# Patient Record
Sex: FEMALE | Race: WHITE | NOT HISPANIC OR LATINO | Employment: OTHER | URBAN - METROPOLITAN AREA
[De-identification: names, ages, dates, MRNs, and addresses within clinical notes are randomized per-mention and may not be internally consistent; named-entity substitution may affect disease eponyms.]

---

## 2017-01-01 ENCOUNTER — TELEPHONE (OUTPATIENT)
Dept: INTERNAL MEDICINE | Facility: CLINIC | Age: 82
End: 2017-01-01

## 2017-01-01 ENCOUNTER — HOSPITAL ENCOUNTER (EMERGENCY)
Facility: HOSPITAL | Age: 82
Discharge: HOME | End: 2017-07-26
Attending: EMERGENCY MEDICINE
Payer: MEDICARE

## 2017-01-01 ENCOUNTER — OFFICE VISIT (OUTPATIENT)
Dept: RHEUMATOLOGY | Facility: CLINIC | Age: 82
End: 2017-01-01
Payer: MEDICARE

## 2017-01-01 ENCOUNTER — OFFICE VISIT (OUTPATIENT)
Dept: CARDIOLOGY | Facility: CLINIC | Age: 82
End: 2017-01-01
Payer: MEDICARE

## 2017-01-01 ENCOUNTER — LAB VISIT (OUTPATIENT)
Dept: LAB | Facility: HOSPITAL | Age: 82
End: 2017-01-01
Attending: INTERNAL MEDICINE
Payer: MEDICARE

## 2017-01-01 ENCOUNTER — LAB VISIT (OUTPATIENT)
Dept: LAB | Facility: HOSPITAL | Age: 82
End: 2017-01-01
Attending: PHYSICIAN ASSISTANT
Payer: MEDICARE

## 2017-01-01 ENCOUNTER — OUTPATIENT CASE MANAGEMENT (OUTPATIENT)
Dept: ADMINISTRATIVE | Facility: OTHER | Age: 82
End: 2017-01-01

## 2017-01-01 ENCOUNTER — PATIENT MESSAGE (OUTPATIENT)
Dept: INTERNAL MEDICINE | Facility: CLINIC | Age: 82
End: 2017-01-01

## 2017-01-01 ENCOUNTER — OFFICE VISIT (OUTPATIENT)
Dept: INTERNAL MEDICINE | Facility: CLINIC | Age: 82
End: 2017-01-01
Payer: MEDICARE

## 2017-01-01 ENCOUNTER — HOSPITAL ENCOUNTER (EMERGENCY)
Facility: HOSPITAL | Age: 82
Discharge: HOME | End: 2017-07-08
Attending: EMERGENCY MEDICINE
Payer: MEDICARE

## 2017-01-01 ENCOUNTER — DOCUMENTATION ONLY (OUTPATIENT)
Dept: AUDIOLOGY | Facility: CLINIC | Age: 82
End: 2017-01-01

## 2017-01-01 ENCOUNTER — HOSPITAL ENCOUNTER (OUTPATIENT)
Dept: RADIOLOGY | Facility: HOSPITAL | Age: 82
Discharge: HOME | End: 2017-06-07
Attending: FAMILY MEDICINE
Payer: MEDICARE

## 2017-01-01 ENCOUNTER — OFFICE VISIT (OUTPATIENT)
Dept: OTOLARYNGOLOGY | Facility: CLINIC | Age: 82
End: 2017-01-01
Payer: MEDICARE

## 2017-01-01 ENCOUNTER — HOSPITAL ENCOUNTER (EMERGENCY)
Facility: HOSPITAL | Age: 82
Discharge: OTHER INSTITUTION | End: 2017-06-01
Attending: EMERGENCY MEDICINE
Payer: MEDICARE

## 2017-01-01 ENCOUNTER — PATIENT MESSAGE (OUTPATIENT)
Dept: CARDIOLOGY | Facility: CLINIC | Age: 82
End: 2017-01-01

## 2017-01-01 ENCOUNTER — HOSPITAL ENCOUNTER (EMERGENCY)
Facility: HOSPITAL | Age: 82
Discharge: HOME | End: 2017-06-11
Attending: EMERGENCY MEDICINE
Payer: MEDICARE

## 2017-01-01 ENCOUNTER — TELEPHONE (OUTPATIENT)
Dept: OTOLARYNGOLOGY | Facility: CLINIC | Age: 82
End: 2017-01-01

## 2017-01-01 ENCOUNTER — PATIENT MESSAGE (OUTPATIENT)
Dept: ADMINISTRATIVE | Facility: OTHER | Age: 82
End: 2017-01-01

## 2017-01-01 VITALS
SYSTOLIC BLOOD PRESSURE: 136 MMHG | WEIGHT: 104.94 LBS | BODY MASS INDEX: 20.6 KG/M2 | DIASTOLIC BLOOD PRESSURE: 74 MMHG | HEART RATE: 80 BPM | HEIGHT: 60 IN | TEMPERATURE: 98 F

## 2017-01-01 VITALS
BODY MASS INDEX: 20.65 KG/M2 | HEART RATE: 77 BPM | DIASTOLIC BLOOD PRESSURE: 51 MMHG | SYSTOLIC BLOOD PRESSURE: 106 MMHG | HEART RATE: 92 BPM | TEMPERATURE: 98 F | DIASTOLIC BLOOD PRESSURE: 60 MMHG | SYSTOLIC BLOOD PRESSURE: 110 MMHG | WEIGHT: 105.19 LBS | DIASTOLIC BLOOD PRESSURE: 62 MMHG | HEIGHT: 60 IN | TEMPERATURE: 98 F | BODY MASS INDEX: 20.65 KG/M2 | HEIGHT: 60 IN | WEIGHT: 105.19 LBS | OXYGEN SATURATION: 90 % | TEMPERATURE: 99 F | SYSTOLIC BLOOD PRESSURE: 102 MMHG

## 2017-01-01 VITALS
BODY MASS INDEX: 20.69 KG/M2 | SYSTOLIC BLOOD PRESSURE: 118 MMHG | HEIGHT: 60 IN | WEIGHT: 105.38 LBS | HEART RATE: 86 BPM | DIASTOLIC BLOOD PRESSURE: 71 MMHG

## 2017-01-01 VITALS
OXYGEN SATURATION: 97 % | RESPIRATION RATE: 20 BRPM | WEIGHT: 99 LBS | HEART RATE: 87 BPM | DIASTOLIC BLOOD PRESSURE: 76 MMHG | TEMPERATURE: 97 F | BODY MASS INDEX: 19.33 KG/M2 | SYSTOLIC BLOOD PRESSURE: 126 MMHG

## 2017-01-01 VITALS
DIASTOLIC BLOOD PRESSURE: 64 MMHG | HEIGHT: 60 IN | BODY MASS INDEX: 21.65 KG/M2 | HEART RATE: 75 BPM | WEIGHT: 110.25 LBS | TEMPERATURE: 98 F | SYSTOLIC BLOOD PRESSURE: 142 MMHG

## 2017-01-01 VITALS
WEIGHT: 97.44 LBS | HEART RATE: 65 BPM | SYSTOLIC BLOOD PRESSURE: 130 MMHG | BODY MASS INDEX: 19.03 KG/M2 | TEMPERATURE: 98 F | DIASTOLIC BLOOD PRESSURE: 80 MMHG

## 2017-01-01 VITALS
WEIGHT: 107.81 LBS | SYSTOLIC BLOOD PRESSURE: 110 MMHG | HEIGHT: 60 IN | BODY MASS INDEX: 21.17 KG/M2 | OXYGEN SATURATION: 93 % | HEART RATE: 70 BPM | DIASTOLIC BLOOD PRESSURE: 60 MMHG

## 2017-01-01 VITALS
OXYGEN SATURATION: 97 % | WEIGHT: 105 LBS | DIASTOLIC BLOOD PRESSURE: 79 MMHG | RESPIRATION RATE: 19 BRPM | HEART RATE: 70 BPM | HEIGHT: 60 IN | TEMPERATURE: 98 F | SYSTOLIC BLOOD PRESSURE: 135 MMHG | BODY MASS INDEX: 20.62 KG/M2

## 2017-01-01 VITALS
HEIGHT: 60 IN | DIASTOLIC BLOOD PRESSURE: 64 MMHG | HEART RATE: 68 BPM | BODY MASS INDEX: 19.09 KG/M2 | SYSTOLIC BLOOD PRESSURE: 116 MMHG | WEIGHT: 97.25 LBS

## 2017-01-01 VITALS
TEMPERATURE: 98 F | HEIGHT: 60 IN | WEIGHT: 105 LBS | HEART RATE: 86 BPM | RESPIRATION RATE: 22 BRPM | SYSTOLIC BLOOD PRESSURE: 140 MMHG | OXYGEN SATURATION: 98 % | BODY MASS INDEX: 20.62 KG/M2 | DIASTOLIC BLOOD PRESSURE: 68 MMHG

## 2017-01-01 VITALS
HEART RATE: 64 BPM | HEIGHT: 60 IN | BODY MASS INDEX: 19.67 KG/M2 | SYSTOLIC BLOOD PRESSURE: 122 MMHG | DIASTOLIC BLOOD PRESSURE: 70 MMHG | WEIGHT: 100.19 LBS

## 2017-01-01 VITALS
HEART RATE: 82 BPM | HEIGHT: 61 IN | SYSTOLIC BLOOD PRESSURE: 100 MMHG | DIASTOLIC BLOOD PRESSURE: 70 MMHG | TEMPERATURE: 98 F | OXYGEN SATURATION: 97 %

## 2017-01-01 VITALS
HEIGHT: 56 IN | OXYGEN SATURATION: 96 % | HEART RATE: 114 BPM | DIASTOLIC BLOOD PRESSURE: 77 MMHG | BODY MASS INDEX: 23.62 KG/M2 | WEIGHT: 105 LBS | SYSTOLIC BLOOD PRESSURE: 142 MMHG | TEMPERATURE: 98 F | RESPIRATION RATE: 29 BRPM

## 2017-01-01 DIAGNOSIS — R41.82 ALTERED MENTAL STATUS, UNSPECIFIED ALTERED MENTAL STATUS TYPE: ICD-10-CM

## 2017-01-01 DIAGNOSIS — I50.42 CHRONIC COMBINED SYSTOLIC AND DIASTOLIC CONGESTIVE HEART FAILURE: Chronic | ICD-10-CM

## 2017-01-01 DIAGNOSIS — S42.031A CLOSED DISPLACED FRACTURE OF ACROMIAL END OF RIGHT CLAVICLE, INITIAL ENCOUNTER: ICD-10-CM

## 2017-01-01 DIAGNOSIS — Z95.0 PACEMAKER: Chronic | ICD-10-CM

## 2017-01-01 DIAGNOSIS — S00.03XA CONTUSION OF SCALP, INITIAL ENCOUNTER: ICD-10-CM

## 2017-01-01 DIAGNOSIS — I48.20 CHRONIC ATRIAL FIBRILLATION: Chronic | ICD-10-CM

## 2017-01-01 DIAGNOSIS — S00.03XD SCALP HEMATOMA, SUBSEQUENT ENCOUNTER: Primary | ICD-10-CM

## 2017-01-01 DIAGNOSIS — R11.0 NAUSEA: ICD-10-CM
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
## 2017-01-01 DIAGNOSIS — M81.0 OSTEOPOROSIS: ICD-10-CM

## 2017-01-01 DIAGNOSIS — I60.9 SAH (SUBARACHNOID HEMORRHAGE): ICD-10-CM

## 2017-01-01 DIAGNOSIS — S40.021A CONTUSION OF MULTIPLE SITES OF SHOULDER AND UPPER ARM, RIGHT, INITIAL ENCOUNTER: ICD-10-CM

## 2017-01-01 DIAGNOSIS — M25.551 RIGHT HIP PAIN: Primary | ICD-10-CM

## 2017-01-01 DIAGNOSIS — Z95.0 PACEMAKER: ICD-10-CM

## 2017-01-01 DIAGNOSIS — I25.10 CORONARY ARTERY DISEASE INVOLVING NATIVE CORONARY ARTERY OF NATIVE HEART WITHOUT ANGINA PECTORIS: Chronic | ICD-10-CM

## 2017-01-01 DIAGNOSIS — S32.591A PUBIC RAMUS FRACTURE, RIGHT, CLOSED, INITIAL ENCOUNTER: Primary | ICD-10-CM

## 2017-01-01 DIAGNOSIS — M81.0 AGE-RELATED OSTEOPOROSIS WITHOUT CURRENT PATHOLOGICAL FRACTURE: Primary | Chronic | ICD-10-CM

## 2017-01-01 DIAGNOSIS — S06.360D TRAUMATIC HEMORRHAGE OF CEREBRUM WITHOUT LOSS OF CONSCIOUSNESS, UNSPECIFIED LATERALITY, SUBSEQUENT ENCOUNTER: ICD-10-CM

## 2017-01-01 DIAGNOSIS — R79.89 LOW SERUM VITAMIN D: ICD-10-CM

## 2017-01-01 DIAGNOSIS — K44.9 HIATAL HERNIA: ICD-10-CM
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
## 2017-01-01 DIAGNOSIS — S40.011A CONTUSION OF MULTIPLE SITES OF SHOULDER AND UPPER ARM, RIGHT, INITIAL ENCOUNTER: ICD-10-CM

## 2017-01-01 DIAGNOSIS — M41.9 KYPHOSCOLIOSIS: Chronic | ICD-10-CM

## 2017-01-01 DIAGNOSIS — S09.90XA HEAD TRAUMA: ICD-10-CM

## 2017-01-01 DIAGNOSIS — J01.90 ACUTE SINUSITIS, RECURRENCE NOT SPECIFIED, UNSPECIFIED LOCATION: Primary | ICD-10-CM

## 2017-01-01 DIAGNOSIS — Z51.81 MEDICATION MONITORING ENCOUNTER: ICD-10-CM

## 2017-01-01 DIAGNOSIS — M15.9 PRIMARY OSTEOARTHRITIS INVOLVING MULTIPLE JOINTS: Chronic | ICD-10-CM

## 2017-01-01 DIAGNOSIS — E03.9 HYPOTHYROIDISM (ACQUIRED): ICD-10-CM

## 2017-01-01 DIAGNOSIS — L89.610 DECUBITUS ULCER OF RIGHT HEEL, UNSTAGEABLE: Primary | ICD-10-CM

## 2017-01-01 DIAGNOSIS — M81.0 AGE-RELATED OSTEOPOROSIS WITHOUT CURRENT PATHOLOGICAL FRACTURE: Primary | ICD-10-CM

## 2017-01-01 DIAGNOSIS — R54 FRAIL ELDERLY: ICD-10-CM

## 2017-01-01 DIAGNOSIS — M47.816 SPONDYLOSIS OF LUMBAR REGION WITHOUT MYELOPATHY OR RADICULOPATHY: ICD-10-CM

## 2017-01-01 DIAGNOSIS — I10 ESSENTIAL HYPERTENSION: Chronic | ICD-10-CM

## 2017-01-01 DIAGNOSIS — M81.0 OSTEOPOROSIS: Primary | Chronic | ICD-10-CM

## 2017-01-01 DIAGNOSIS — S09.90XA HEAD INJURY DUE TO TRAUMA, INITIAL ENCOUNTER: ICD-10-CM

## 2017-01-01 DIAGNOSIS — J01.90 ACUTE BACTERIAL SINUSITIS: Primary | ICD-10-CM

## 2017-01-01 DIAGNOSIS — R60.9 EDEMA, UNSPECIFIED TYPE: ICD-10-CM

## 2017-01-01 DIAGNOSIS — E86.0 DEHYDRATION: Primary | ICD-10-CM
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
## 2017-01-01 DIAGNOSIS — R53.1 WEAKNESS: ICD-10-CM

## 2017-01-01 DIAGNOSIS — B96.89 ACUTE BACTERIAL SINUSITIS: Primary | ICD-10-CM

## 2017-01-01 DIAGNOSIS — I50.42 CHRONIC COMBINED SYSTOLIC AND DIASTOLIC CONGESTIVE HEART FAILURE: Primary | Chronic | ICD-10-CM

## 2017-01-01 DIAGNOSIS — I34.0 NON-RHEUMATIC MITRAL REGURGITATION: ICD-10-CM

## 2017-01-01 DIAGNOSIS — R54 AGE-RELATED PHYSICAL DEBILITY: ICD-10-CM

## 2017-01-01 DIAGNOSIS — M25.551 RIGHT HIP PAIN: ICD-10-CM

## 2017-01-01 DIAGNOSIS — R41.82 ALTERED MENTAL STATUS: ICD-10-CM

## 2017-01-01 DIAGNOSIS — I48.20 CHRONIC ATRIAL FIBRILLATION: Primary | Chronic | ICD-10-CM

## 2017-01-01 DIAGNOSIS — H90.3 SENSORINEURAL HEARING LOSS (SNHL) OF BOTH EARS: ICD-10-CM

## 2017-01-01 DIAGNOSIS — Z11.1 SCREENING-PULMONARY TB: ICD-10-CM

## 2017-01-01 DIAGNOSIS — M15.9 PRIMARY OSTEOARTHRITIS INVOLVING MULTIPLE JOINTS: ICD-10-CM

## 2017-01-01 DIAGNOSIS — R11.10 VOMITING: ICD-10-CM

## 2017-01-01 DIAGNOSIS — S42.034D CLOSED NONDISPLACED FRACTURE OF ACROMIAL END OF RIGHT CLAVICLE WITH ROUTINE HEALING, SUBSEQUENT ENCOUNTER: Primary | ICD-10-CM

## 2017-01-01 DIAGNOSIS — I95.1 ORTHOSTATIC HYPOTENSION: ICD-10-CM

## 2017-01-01 DIAGNOSIS — R60.0 BILATERAL LOWER EXTREMITY EDEMA: ICD-10-CM

## 2017-01-01 DIAGNOSIS — S00.01XA SCALP ABRASION, INITIAL ENCOUNTER: ICD-10-CM

## 2017-01-01 DIAGNOSIS — K21.00 GASTROESOPHAGEAL REFLUX DISEASE WITH ESOPHAGITIS: ICD-10-CM

## 2017-01-01 DIAGNOSIS — R29.6 FALLS FREQUENTLY: ICD-10-CM

## 2017-01-01 LAB
ALBUMIN SERPL BCP-MCNC: 3.1 G/DL
ALBUMIN SERPL BCP-MCNC: 3.3 G/DL
ALBUMIN SERPL BCP-MCNC: 3.3 G/DL
ALBUMIN SERPL BCP-MCNC: 3.5 G/DL
ALBUMIN SERPL BCP-MCNC: 3.6 G/DL
ALP SERPL-CCNC: 101 U/L
ALP SERPL-CCNC: 47 U/L
ALP SERPL-CCNC: 48 U/L
ALP SERPL-CCNC: 56 U/L
ALP SERPL-CCNC: 59 U/L
ALT SERPL W/O P-5'-P-CCNC: 10 U/L
ALT SERPL W/O P-5'-P-CCNC: 10 U/L
ALT SERPL W/O P-5'-P-CCNC: 14 U/L
ALT SERPL W/O P-5'-P-CCNC: 7 U/L
ALT SERPL W/O P-5'-P-CCNC: 9 U/L
ANION GAP SERPL CALC-SCNC: 10 MMOL/L
ANION GAP SERPL CALC-SCNC: 10 MMOL/L
ANION GAP SERPL CALC-SCNC: 11 MMOL/L
ANION GAP SERPL CALC-SCNC: 9 MMOL/L
ANION GAP SERPL CALC-SCNC: 9 MMOL/L
APTT BLDCRRT: 31 SEC
AST SERPL-CCNC: 15 U/L
AST SERPL-CCNC: 15 U/L
AST SERPL-CCNC: 16 U/L
AST SERPL-CCNC: 17 U/L
AST SERPL-CCNC: 23 U/L
BACTERIA #/AREA URNS HPF: ABNORMAL /HPF
BASOPHILS # BLD AUTO: 0.02 K/UL
BASOPHILS # BLD AUTO: 0.02 K/UL
BASOPHILS # BLD AUTO: 0.03 K/UL
BASOPHILS # BLD AUTO: ABNORMAL K/UL
BASOPHILS NFR BLD: 0 %
BASOPHILS NFR BLD: 0.3 %
BASOPHILS NFR BLD: 0.3 %
BASOPHILS NFR BLD: 0.5 %
BILIRUB SERPL-MCNC: 0.4 MG/DL
BILIRUB SERPL-MCNC: 0.5 MG/DL
BILIRUB SERPL-MCNC: 0.5 MG/DL
BILIRUB SERPL-MCNC: 0.7 MG/DL
BILIRUB SERPL-MCNC: 0.7 MG/DL
BILIRUB UR QL STRIP: NEGATIVE
BNP SERPL-MCNC: 521 PG/ML
BNP SERPL-MCNC: 757 PG/ML
BUN SERPL-MCNC: 12 MG/DL
BUN SERPL-MCNC: 13 MG/DL
BUN SERPL-MCNC: 15 MG/DL
BUN SERPL-MCNC: 20 MG/DL
BUN SERPL-MCNC: 24 MG/DL
CALCIUM SERPL-MCNC: 8.3 MG/DL
CALCIUM SERPL-MCNC: 9 MG/DL
CALCIUM SERPL-MCNC: 9.3 MG/DL
CALCIUM SERPL-MCNC: 9.6 MG/DL
CALCIUM SERPL-MCNC: 9.8 MG/DL
CHLORIDE SERPL-SCNC: 100 MMOL/L
CHLORIDE SERPL-SCNC: 102 MMOL/L
CHLORIDE SERPL-SCNC: 96 MMOL/L
CHLORIDE SERPL-SCNC: 98 MMOL/L
CHLORIDE SERPL-SCNC: 99 MMOL/L
CK MB SERPL-MCNC: 0.9 NG/ML
CK MB SERPL-RTO: 2.8 %
CK SERPL-CCNC: 32 U/L
CK SERPL-CCNC: 32 U/L
CLARITY UR: CLEAR
CO2 SERPL-SCNC: 27 MMOL/L
CO2 SERPL-SCNC: 28 MMOL/L
CO2 SERPL-SCNC: 29 MMOL/L
CO2 SERPL-SCNC: 33 MMOL/L
CO2 SERPL-SCNC: 33 MMOL/L
COLOR UR: YELLOW
CREAT SERPL-MCNC: 0.6 MG/DL
CREAT SERPL-MCNC: 0.6 MG/DL
CREAT SERPL-MCNC: 0.7 MG/DL
CREAT SERPL-MCNC: 0.8 MG/DL
CREAT SERPL-MCNC: 0.8 MG/DL
CRP SERPL-MCNC: 2.9 MG/L
DIFFERENTIAL METHOD: ABNORMAL
EOSINOPHIL # BLD AUTO: 0.1 K/UL
EOSINOPHIL # BLD AUTO: 0.1 K/UL
EOSINOPHIL # BLD AUTO: 0.2 K/UL
EOSINOPHIL # BLD AUTO: ABNORMAL K/UL
EOSINOPHIL NFR BLD: 1.7 %
EOSINOPHIL NFR BLD: 2.2 %
EOSINOPHIL NFR BLD: 3 %
EOSINOPHIL NFR BLD: 3.3 %
ERYTHROCYTE [DISTWIDTH] IN BLOOD BY AUTOMATED COUNT: 14.2 %
ERYTHROCYTE [DISTWIDTH] IN BLOOD BY AUTOMATED COUNT: 14.2 %
ERYTHROCYTE [DISTWIDTH] IN BLOOD BY AUTOMATED COUNT: 14.5 %
ERYTHROCYTE [DISTWIDTH] IN BLOOD BY AUTOMATED COUNT: 14.7 %
ERYTHROCYTE [SEDIMENTATION RATE] IN BLOOD BY WESTERGREN METHOD: 20 MM/HR
EST. GFR  (AFRICAN AMERICAN): >60 ML/MIN/1.73 M^2
EST. GFR  (NON AFRICAN AMERICAN): >60 ML/MIN/1.73 M^2
GLUCOSE SERPL-MCNC: 109 MG/DL
GLUCOSE SERPL-MCNC: 124 MG/DL
GLUCOSE SERPL-MCNC: 90 MG/DL
GLUCOSE SERPL-MCNC: 95 MG/DL
GLUCOSE SERPL-MCNC: 99 MG/DL
GLUCOSE UR QL STRIP: NEGATIVE
HCT VFR BLD AUTO: 33.6 %
HCT VFR BLD AUTO: 34.1 %
HCT VFR BLD AUTO: 35.8 %
HCT VFR BLD AUTO: 36.8 %
HGB BLD-MCNC: 11.1 G/DL
HGB BLD-MCNC: 11.5 G/DL
HGB BLD-MCNC: 12 G/DL
HGB BLD-MCNC: 12 G/DL
HGB UR QL STRIP: ABNORMAL
INR PPP: 1.2
INR PPP: 1.2
KETONES UR QL STRIP: ABNORMAL
KETONES UR QL STRIP: NEGATIVE
KETONES UR QL STRIP: NEGATIVE
LEUKOCYTE ESTERASE UR QL STRIP: NEGATIVE
LYMPHOCYTES # BLD AUTO: 1.4 K/UL
LYMPHOCYTES # BLD AUTO: 1.6 K/UL
LYMPHOCYTES # BLD AUTO: 1.8 K/UL
LYMPHOCYTES # BLD AUTO: ABNORMAL K/UL
LYMPHOCYTES NFR BLD: 19.1 %
LYMPHOCYTES NFR BLD: 25.2 %
LYMPHOCYTES NFR BLD: 27 %
LYMPHOCYTES NFR BLD: 31.5 %
MCH RBC QN AUTO: 32.7 PG
MCH RBC QN AUTO: 32.7 PG
MCH RBC QN AUTO: 32.9 PG
MCH RBC QN AUTO: 33 PG
MCHC RBC AUTO-ENTMCNC: 32.6 %
MCHC RBC AUTO-ENTMCNC: 33 G/DL
MCHC RBC AUTO-ENTMCNC: 33.5 %
MCHC RBC AUTO-ENTMCNC: 33.7 %
MCV RBC AUTO: 100 FL
MCV RBC AUTO: 100 FL
MCV RBC AUTO: 97 FL
MCV RBC AUTO: 98 FL
MICROSCOPIC COMMENT: ABNORMAL
MONOCYTES # BLD AUTO: 0.4 K/UL
MONOCYTES # BLD AUTO: 0.6 K/UL
MONOCYTES # BLD AUTO: 0.8 K/UL
MONOCYTES # BLD AUTO: ABNORMAL K/UL
MONOCYTES NFR BLD: 10 %
MONOCYTES NFR BLD: 11.1 %
MONOCYTES NFR BLD: 7.5 %
MONOCYTES NFR BLD: 9.9 %
NEUTROPHILS # BLD AUTO: 3.3 K/UL
NEUTROPHILS # BLD AUTO: 3.9 K/UL
NEUTROPHILS # BLD AUTO: 5.1 K/UL
NEUTROPHILS NFR BLD: 57.2 %
NEUTROPHILS NFR BLD: 60 %
NEUTROPHILS NFR BLD: 62.4 %
NEUTROPHILS NFR BLD: 67.8 %
NITRITE UR QL STRIP: NEGATIVE
NITRITE UR QL STRIP: NEGATIVE
NITRITE UR QL STRIP: POSITIVE
PH UR STRIP: 6 [PH] (ref 5–8)
PLATELET # BLD AUTO: 180 K/UL
PLATELET # BLD AUTO: 183 K/UL
PLATELET # BLD AUTO: 194 K/UL
PLATELET # BLD AUTO: 230 K/UL
PMV BLD AUTO: 8.6 FL
PMV BLD AUTO: 9 FL
PMV BLD AUTO: 9.4 FL
PMV BLD AUTO: 9.5 FL
POTASSIUM SERPL-SCNC: 3.4 MMOL/L
POTASSIUM SERPL-SCNC: 3.6 MMOL/L
POTASSIUM SERPL-SCNC: 4.2 MMOL/L
PROT SERPL-MCNC: 7 G/DL
PROT SERPL-MCNC: 7.4 G/DL
PROT SERPL-MCNC: 7.6 G/DL
PROT SERPL-MCNC: 7.6 G/DL
PROT SERPL-MCNC: 7.7 G/DL
PROT UR QL STRIP: ABNORMAL
PROT UR QL STRIP: ABNORMAL
PROT UR QL STRIP: NEGATIVE
PROTHROMBIN TIME: 12.1 SEC
PROTHROMBIN TIME: 12.2 SEC
RBC # BLD AUTO: 3.37 M/UL
RBC # BLD AUTO: 3.52 M/UL
RBC # BLD AUTO: 3.64 M/UL
RBC # BLD AUTO: 3.67 M/UL
RBC #/AREA URNS HPF: 0 /HPF (ref 0–4)
SODIUM SERPL-SCNC: 136 MMOL/L
SODIUM SERPL-SCNC: 137 MMOL/L
SODIUM SERPL-SCNC: 138 MMOL/L
SODIUM SERPL-SCNC: 139 MMOL/L
SODIUM SERPL-SCNC: 144 MMOL/L
SP GR UR STRIP: 1.02 (ref 1–1.03)
TB INDURATION - 48 HR READ: 0 MM
TB INDURATION - 72 HR READ: NORMAL MM
TB SKIN TEST - 48 HR READ: NEGATIVE
TB SKIN TEST - 72 HR READ: NORMAL
TROPONIN I SERPL DL<=0.01 NG/ML-MCNC: 0.01 NG/ML
TROPONIN I SERPL DL<=0.01 NG/ML-MCNC: 0.02 NG/ML
TROPONIN I SERPL DL<=0.01 NG/ML-MCNC: 0.04 NG/ML
URN SPEC COLLECT METH UR: ABNORMAL
UROBILINOGEN UR STRIP-ACNC: 1 EU/DL
UROBILINOGEN UR STRIP-ACNC: ABNORMAL EU/DL
UROBILINOGEN UR STRIP-ACNC: NEGATIVE EU/DL
WBC # BLD AUTO: 5.75 K/UL
WBC # BLD AUTO: 6.28 K/UL
WBC # BLD AUTO: 7.31 K/UL
WBC # BLD AUTO: 7.55 K/UL
WBC #/AREA URNS HPF: 2 /HPF (ref 0–5)

## 2017-01-01 PROCEDURE — 99499 UNLISTED E&M SERVICE: CPT | Mod: S$GLB,,, | Performed by: INTERNAL MEDICINE

## 2017-01-01 PROCEDURE — 1160F RVW MEDS BY RX/DR IN RCRD: CPT | Mod: S$GLB,,, | Performed by: FAMILY MEDICINE

## 2017-01-01 PROCEDURE — 99999 PR PBB SHADOW E&M-EST. PATIENT-LVL IV: CPT | Mod: PBBFAC,,, | Performed by: PHYSICIAN ASSISTANT

## 2017-01-01 PROCEDURE — 36415 COLL VENOUS BLD VENIPUNCTURE: CPT | Mod: PO

## 2017-01-01 PROCEDURE — 99213 OFFICE O/P EST LOW 20 MIN: CPT | Mod: S$GLB,,, | Performed by: FAMILY MEDICINE

## 2017-01-01 PROCEDURE — 80053 COMPREHEN METABOLIC PANEL: CPT

## 2017-01-01 PROCEDURE — 1125F AMNT PAIN NOTED PAIN PRSNT: CPT | Mod: S$GLB,,, | Performed by: FAMILY MEDICINE

## 2017-01-01 PROCEDURE — 3008F BODY MASS INDEX DOCD: CPT | Mod: S$GLB,,, | Performed by: ORTHOPAEDIC SURGERY

## 2017-01-01 PROCEDURE — 93005 ELECTROCARDIOGRAM TRACING: CPT

## 2017-01-01 PROCEDURE — 96372 THER/PROPH/DIAG INJ SC/IM: CPT | Mod: S$GLB,,, | Performed by: PHYSICIAN ASSISTANT

## 2017-01-01 PROCEDURE — 99285 EMERGENCY DEPT VISIT HI MDM: CPT | Mod: 25

## 2017-01-01 PROCEDURE — 99284 EMERGENCY DEPT VISIT MOD MDM: CPT

## 2017-01-01 PROCEDURE — P9612 CATHETERIZE FOR URINE SPEC: HCPCS

## 2017-01-01 PROCEDURE — 99999 PR PBB SHADOW E&M-EST. PATIENT-LVL IV: CPT | Mod: PBBFAC,,, | Performed by: NURSE PRACTITIONER

## 2017-01-01 PROCEDURE — 83880 ASSAY OF NATRIURETIC PEPTIDE: CPT

## 2017-01-01 PROCEDURE — 1160F RVW MEDS BY RX/DR IN RCRD: CPT | Mod: S$GLB,,, | Performed by: ORTHOPAEDIC SURGERY

## 2017-01-01 PROCEDURE — 85610 PROTHROMBIN TIME: CPT

## 2017-01-01 PROCEDURE — 85025 COMPLETE CBC W/AUTO DIFF WBC: CPT

## 2017-01-01 PROCEDURE — 99499 UNLISTED E&M SERVICE: CPT | Mod: S$GLB,,, | Performed by: PHYSICIAN ASSISTANT

## 2017-01-01 PROCEDURE — 93010 ELECTROCARDIOGRAM REPORT: CPT | Mod: ,,, | Performed by: INTERNAL MEDICINE

## 2017-01-01 PROCEDURE — 1157F ADVNC CARE PLAN IN RCRD: CPT | Mod: S$GLB,,, | Performed by: FAMILY MEDICINE

## 2017-01-01 PROCEDURE — 73502 X-RAY EXAM HIP UNI 2-3 VIEWS: CPT | Mod: TC,RT

## 2017-01-01 PROCEDURE — 99999 PR PBB SHADOW E&M-EST. PATIENT-LVL III: CPT | Mod: PBBFAC,,, | Performed by: ORTHOPAEDIC SURGERY

## 2017-01-01 PROCEDURE — 1125F AMNT PAIN NOTED PAIN PRSNT: CPT | Mod: S$GLB,,, | Performed by: ORTHOPAEDIC SURGERY

## 2017-01-01 PROCEDURE — 81003 URINALYSIS AUTO W/O SCOPE: CPT

## 2017-01-01 PROCEDURE — 99499 UNLISTED E&M SERVICE: CPT | Mod: S$GLB,,, | Performed by: FAMILY MEDICINE

## 2017-01-01 PROCEDURE — 1157F ADVNC CARE PLAN IN RCRD: CPT | Mod: S$GLB,,, | Performed by: ORTHOPAEDIC SURGERY

## 2017-01-01 PROCEDURE — 99213 OFFICE O/P EST LOW 20 MIN: CPT | Mod: S$GLB,,, | Performed by: ORTHOPAEDIC SURGERY

## 2017-01-01 PROCEDURE — 96360 HYDRATION IV INFUSION INIT: CPT

## 2017-01-01 PROCEDURE — 73502 X-RAY EXAM HIP UNI 2-3 VIEWS: CPT | Mod: 26,RT,, | Performed by: RADIOLOGY

## 2017-01-01 PROCEDURE — 85027 COMPLETE CBC AUTOMATED: CPT

## 2017-01-01 PROCEDURE — 84484 ASSAY OF TROPONIN QUANT: CPT

## 2017-01-01 PROCEDURE — 96372 THER/PROPH/DIAG INJ SC/IM: CPT | Mod: S$GLB,,, | Performed by: INTERNAL MEDICINE

## 2017-01-01 PROCEDURE — 99214 OFFICE O/P EST MOD 30 MIN: CPT | Mod: S$GLB,,, | Performed by: INTERNAL MEDICINE

## 2017-01-01 PROCEDURE — 1126F AMNT PAIN NOTED NONE PRSNT: CPT | Mod: S$GLB,,, | Performed by: FAMILY MEDICINE

## 2017-01-01 PROCEDURE — 99284 EMERGENCY DEPT VISIT MOD MDM: CPT | Mod: 25

## 2017-01-01 PROCEDURE — 85007 BL SMEAR W/DIFF WBC COUNT: CPT

## 2017-01-01 PROCEDURE — 1159F MED LIST DOCD IN RCRD: CPT | Mod: S$GLB,,, | Performed by: INTERNAL MEDICINE

## 2017-01-01 PROCEDURE — 1125F AMNT PAIN NOTED PAIN PRSNT: CPT | Mod: S$GLB,,, | Performed by: NURSE PRACTITIONER

## 2017-01-01 PROCEDURE — 99999 PR PBB SHADOW E&M-EST. PATIENT-LVL III: CPT | Mod: PBBFAC,,, | Performed by: FAMILY MEDICINE

## 2017-01-01 PROCEDURE — 1159F MED LIST DOCD IN RCRD: CPT | Mod: S$GLB,,, | Performed by: NURSE PRACTITIONER

## 2017-01-01 PROCEDURE — 85730 THROMBOPLASTIN TIME PARTIAL: CPT

## 2017-01-01 PROCEDURE — 1159F MED LIST DOCD IN RCRD: CPT | Mod: S$GLB,,, | Performed by: FAMILY MEDICINE

## 2017-01-01 PROCEDURE — 99214 OFFICE O/P EST MOD 30 MIN: CPT | Mod: S$GLB,,, | Performed by: PHYSICIAN ASSISTANT

## 2017-01-01 PROCEDURE — 99214 OFFICE O/P EST MOD 30 MIN: CPT | Mod: 25,S$GLB,, | Performed by: PHYSICIAN ASSISTANT

## 2017-01-01 PROCEDURE — 99214 OFFICE O/P EST MOD 30 MIN: CPT | Mod: S$GLB,,, | Performed by: FAMILY MEDICINE

## 2017-01-01 PROCEDURE — 1126F AMNT PAIN NOTED NONE PRSNT: CPT | Mod: S$GLB,,, | Performed by: INTERNAL MEDICINE

## 2017-01-01 PROCEDURE — 99213 OFFICE O/P EST LOW 20 MIN: CPT | Mod: S$GLB,,, | Performed by: NURSE PRACTITIONER

## 2017-01-01 PROCEDURE — 1125F AMNT PAIN NOTED PAIN PRSNT: CPT | Mod: S$GLB,,, | Performed by: PHYSICIAN ASSISTANT

## 2017-01-01 PROCEDURE — 1160F RVW MEDS BY RX/DR IN RCRD: CPT | Mod: S$GLB,,, | Performed by: INTERNAL MEDICINE

## 2017-01-01 PROCEDURE — 82553 CREATINE MB FRACTION: CPT

## 2017-01-01 PROCEDURE — 25000003 PHARM REV CODE 250: Performed by: EMERGENCY MEDICINE

## 2017-01-01 PROCEDURE — 3008F BODY MASS INDEX DOCD: CPT | Mod: S$GLB,,, | Performed by: NURSE PRACTITIONER

## 2017-01-01 PROCEDURE — 81000 URINALYSIS NONAUTO W/SCOPE: CPT

## 2017-01-01 PROCEDURE — 1159F MED LIST DOCD IN RCRD: CPT | Mod: S$GLB,,, | Performed by: ORTHOPAEDIC SURGERY

## 2017-01-01 PROCEDURE — 99999 PR PBB SHADOW E&M-EST. PATIENT-LVL III: CPT | Mod: PBBFAC,,, | Performed by: INTERNAL MEDICINE

## 2017-01-01 PROCEDURE — 80053 COMPREHEN METABOLIC PANEL: CPT | Mod: PO

## 2017-01-01 PROCEDURE — 99499 UNLISTED E&M SERVICE: CPT | Mod: S$GLB,,, | Performed by: NURSE PRACTITIONER

## 2017-01-01 PROCEDURE — 1157F ADVNC CARE PLAN IN RCRD: CPT | Mod: S$GLB,,, | Performed by: INTERNAL MEDICINE

## 2017-01-01 PROCEDURE — 99999 PR PBB SHADOW E&M-EST. PATIENT-LVL IV: CPT | Mod: PBBFAC,,, | Performed by: FAMILY MEDICINE

## 2017-01-01 PROCEDURE — 3008F BODY MASS INDEX DOCD: CPT | Mod: S$GLB,,, | Performed by: INTERNAL MEDICINE

## 2017-01-01 PROCEDURE — 86580 TB INTRADERMAL TEST: CPT | Mod: S$GLB,,, | Performed by: FAMILY MEDICINE

## 2017-01-01 PROCEDURE — 93010 ELECTROCARDIOGRAM REPORT: CPT | Mod: S$GLB,,, | Performed by: INTERNAL MEDICINE

## 2017-01-01 PROCEDURE — 1159F MED LIST DOCD IN RCRD: CPT | Mod: S$GLB,,, | Performed by: PHYSICIAN ASSISTANT

## 2017-01-01 PROCEDURE — 3008F BODY MASS INDEX DOCD: CPT | Mod: S$GLB,,, | Performed by: FAMILY MEDICINE

## 2017-01-01 RX ORDER — DOXYCYCLINE HYCLATE 100 MG
100 TABLET ORAL 2 TIMES DAILY
Qty: 20 TABLET | Refills: 0 | Status: SHIPPED | OUTPATIENT
Start: 2017-01-01 | End: 2017-01-01

## 2017-01-01 RX ORDER — MEGESTROL ACETATE 40 MG/ML
SUSPENSION ORAL
COMMUNITY
Start: 2017-01-01

## 2017-01-01 RX ORDER — PANTOPRAZOLE SODIUM 40 MG/1
TABLET, DELAYED RELEASE ORAL
Qty: 30 TABLET | Refills: 0 | Status: SHIPPED | OUTPATIENT
Start: 2017-01-01 | End: 2017-01-01 | Stop reason: SDUPTHER

## 2017-01-01 RX ORDER — DOXYCYCLINE 150 MG/1
150 TABLET ORAL EVERY 12 HOURS
Qty: 20 TABLET | Refills: 0 | Status: SHIPPED | OUTPATIENT
Start: 2017-01-01 | End: 2017-01-01

## 2017-01-01 RX ORDER — METOPROLOL TARTRATE 100 MG/1
TABLET ORAL
Qty: 60 TABLET | Refills: 5 | Status: SHIPPED | OUTPATIENT
Start: 2017-01-01

## 2017-01-01 RX ORDER — ASCORBIC ACID 500 MG
500 TABLET ORAL DAILY
COMMUNITY

## 2017-01-01 RX ORDER — SODIUM CHLORIDE 9 MG/ML
500 INJECTION, SOLUTION INTRAVENOUS
Status: COMPLETED | OUTPATIENT
Start: 2017-01-01 | End: 2017-01-01

## 2017-01-01 RX ORDER — ZINC SULFATE 50(220)MG
220 CAPSULE ORAL DAILY
COMMUNITY

## 2017-01-01 RX ORDER — METOPROLOL TARTRATE 100 MG/1
TABLET ORAL
Qty: 60 TABLET | Refills: 1 | Status: CANCELLED | OUTPATIENT
Start: 2017-01-01

## 2017-01-01 RX ORDER — DILTIAZEM HYDROCHLORIDE 300 MG/1
CAPSULE, COATED, EXTENDED RELEASE ORAL
Qty: 30 CAPSULE | Refills: 5 | Status: SHIPPED | OUTPATIENT
Start: 2017-01-01

## 2017-01-01 RX ORDER — DILTIAZEM HYDROCHLORIDE 300 MG/1
CAPSULE, COATED, EXTENDED RELEASE ORAL
Qty: 30 CAPSULE | Refills: 0 | Status: CANCELLED | OUTPATIENT
Start: 2017-01-01

## 2017-01-01 RX ORDER — FUROSEMIDE 20 MG/1
20 TABLET ORAL DAILY
Qty: 30 TABLET | Refills: 11 | Status: SHIPPED | OUTPATIENT
Start: 2017-01-01

## 2017-01-01 RX ORDER — LEVOTHYROXINE SODIUM 75 UG/1
75 TABLET ORAL DAILY
Qty: 90 TABLET | Refills: 0 | Status: SHIPPED | OUTPATIENT
Start: 2017-01-01

## 2017-01-01 RX ORDER — FUROSEMIDE 20 MG/1
TABLET ORAL
Qty: 30 TABLET | Refills: 3 | Status: CANCELLED | OUTPATIENT
Start: 2017-01-01

## 2017-01-01 RX ORDER — AMOXICILLIN 875 MG/1
875 TABLET, FILM COATED ORAL 2 TIMES DAILY
Qty: 20 TABLET | Refills: 0 | Status: SHIPPED | OUTPATIENT
Start: 2017-01-01 | End: 2017-01-01

## 2017-01-01 RX ORDER — DOXYCYCLINE 100 MG/1
100 CAPSULE ORAL EVERY 12 HOURS
Qty: 14 CAPSULE | Refills: 0 | Status: SHIPPED | OUTPATIENT
Start: 2017-01-01 | End: 2017-01-01

## 2017-01-01 RX ORDER — PANTOPRAZOLE SODIUM 40 MG/1
TABLET, DELAYED RELEASE ORAL
Qty: 30 TABLET | Refills: 11 | Status: SHIPPED | OUTPATIENT
Start: 2017-01-01

## 2017-01-01 RX ORDER — ACETAMINOPHEN 325 MG/1
650 TABLET ORAL
Status: COMPLETED | OUTPATIENT
Start: 2017-01-01 | End: 2017-01-01

## 2017-01-01 RX ADMIN — SODIUM CHLORIDE 500 ML: 0.9 INJECTION, SOLUTION INTRAVENOUS at 12:06

## 2017-01-01 RX ADMIN — ACETAMINOPHEN 650 MG: 325 TABLET ORAL at 12:07

## 2017-01-01 RX ADMIN — SODIUM CHLORIDE 500 ML: 0.9 INJECTION, SOLUTION INTRAVENOUS at 04:07

## 2017-01-01 RX ADMIN — SODIUM CHLORIDE: 0.9 INJECTION, SOLUTION INTRAVENOUS at 08:06

## 2017-01-06 ENCOUNTER — TELEPHONE (OUTPATIENT)
Dept: OTOLARYNGOLOGY | Facility: CLINIC | Age: 82
End: 2017-01-06

## 2017-01-06 NOTE — TELEPHONE ENCOUNTER
----- Message from Ariana Tsang sent at 1/6/2017 11:33 AM CST -----  Contact: Daughter-Domitila McgrawBbwbk-[phone]   Pharmacy waiting on prior authorization to be sent. Please call back @ [phone].  Thanks-[facility]         Pt uses;  .  [facility]/pharmacy #[id number] - [place] - [address]  [address]  [place] [address]  Phone: [phone] Fax: [phone]

## 2017-01-06 NOTE — TELEPHONE ENCOUNTER
Spoke with daughter and advised we have received no fax request from pharmacy for PA.  They fax paperwork with PA phone number.  Daughter is to call pharmacy and request fax be sent and then PA will be initiated.
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
## 2017-02-13 NOTE — MR AVS SNAPSHOT
[facility]  [address] [address]  Phone: [phone]  Fax: [phone]                  Tika Nevarez   2017 2:00 PM   Office Visit    Description:  Female : 3/15/1929   Provider:  Helder Baugh MD   Department:  [facility]                To Do List           Future Appointments        Provider Department Dept Phone    2017 2:00 PM [facility], [facility] [phone]    2017 2:30 PM [facility] [phone]    2017 3:00 PM Shanda Pandey MD [facility] [phone]      Goals (5 Years of Data)     None      [facility] On Call     [facility] On Call Nurse Care Line -  Assistance  Registered nurses in the [facility] provide clinical advisement, health education, appointment booking, and other advisory services.  Call for this free service at [phone].             Medications           Message regarding Medications     Verify the changes and/or additions to your medication regime listed below are the same as discussed with your clinician today.  If any of these changes or additions are incorrect, please notify your healthcare provider.        STOP taking these medications     ondansetron (ZOFRAN-ODT) 4 MG TbDL Take 1 tablet (4 mg total) by mouth every 12 (twelve) hours as needed.           Verify that the below list of medications is an accurate representation of the medications you are currently taking.  If none reported, the list may be blank. If incorrect, please contact your healthcare provider. Carry this list with you in case of emergency.           Current Medications     aspirin (ECOTRIN) 81 MG EC tablet Take 1 tablet (81 mg total) by mouth once daily.    diltiaZEM (CARDIZEM CD) 300 MG 24 hr capsule TAKE 1 CAPSULE (300 MG TOTAL) BY MOUTH ONCE DAILY.    docusate sodium (COLACE) 50 MG capsule Take by mouth 2 (two) times daily.    duloxetine  (CYMBALTA) 30 MG capsule Take 30 mg by mouth.    fluticasone (FLONASE) 50 mcg/actuation nasal spray 1 spray by Each Nare route once daily.    furosemide (LASIX) 20 MG tablet TAKE 1 TABLET BY MOUTH EVERY DAY    hydrocodone-acetaminophen 5-325mg (NORCO) 5-325 mg per tablet     lactulose (CHRONULAC) 10 gram/15 mL solution Take by mouth as needed.    levothyroxine (SYNTHROID) 75 MCG tablet Take 1 tablet (75 mcg total) by mouth once daily.    metoprolol tartrate (LOPRESSOR) 100 MG tablet TAKE 1 TABLET (100 MG TOTAL) BY MOUTH 2 (TWO) TIMES DAILY.    montelukast (SINGULAIR) 10 mg tablet Take 1 tablet (10 mg total) by mouth once daily.    pantoprazole (PROTONIX) 40 MG tablet TAKE 1 TABLET BY MOUTH DAILY 30 MINUTES PRIOR TO BREAKFAST.    polyethylene glycol (GLYCOLAX) 17 gram PwPk Take 17 g by mouth continuous prn.    ranitidine (ZANTAC) 300 MG tablet TAKE 1 TABLET BY MOUTH EVERY DAY    vitamin D 1000 units Tab Take 185 mg by mouth once daily.    ammonium lactate 12 % Crea Apply 1 Act topically 2 (two) times daily.    cetirizine (ZYRTEC) 10 MG tablet Take 10 mg by mouth once daily.    clotrimazole-betamethasone 1-0.05% (LOTRISONE) cream Apply topically 2 (two) times daily.           Clinical Reference Information           Your Vitals Were     BP Pulse Height Weight Last Period SpO2    110/60 (BP Location: Right arm, Patient Position: Sitting, BP Method: Manual) 70 5' (1.524 m) 48.9 kg (107 lb 12.9 oz) 05/15/1960 93%    BMI                21.05 kg/m2          Blood Pressure          Most Recent Value    Right Arm BP - Sitting  110/60    Left Arm BP - Sitting  114/62    BP  110/60      Allergies as of 2/13/2017     Alendronate    Amoxicillin-pot Clavulanate    Morphine    Olmesartan    Propoxyphene N-acetaminophen    Sulfa (Sulfonamide Antibiotics)      Immunizations Administered on Date of Encounter - 2/13/2017     None      Language Assistance Services     ATTENTION: Language assistance services are available, free of  charge. Please call [phone].      ATENCIÓN: Si habla español, tiene a walker disposición servicios gratuitos de asistencia lingüística. Llame al [phone].     CHÚ Ý: N?u b?n nói Ti?ng Vi?t, có các d?ch v? h? tr? ngôn ng? mi?n phí dành cho b?n. G?i s? [phone].         [facility] complies with applicable Federal civil rights laws and does not discriminate on the basis of race, color, national origin, age, disability, or sex.

## 2017-03-13 NOTE — PROGRESS NOTES
Subjective:       Patient ID: Tika Nevarez is a 88 y.o. female.    Chief Complaint: Sinus Problem    HPI Comments: Patient is a very pleasant 87 year old female here to see me today for evaluation of recent sinusitis.  She says that she has been feeling poorly for the last three weeks, and is having increased nasal congestion and drainage.  She is blowing her nose, and is having thick yellow to green mucus.  She does not some scratchiness to her throat, and has been sneezing.  She has not had any recent fevers.  She is on Flonase and Zyrtec daily.  I last saw her about 15 months ago with similar symptoms, and that resolved with oral Amoxil, and she has not had any sinusitis since that visit until this most recent episode.  She has been having headaches and facial pressure.    Review of Systems   Constitutional: Negative for chills, fatigue, fever and unexpected weight change.   HENT: Positive for congestion, hearing loss, postnasal drip, rhinorrhea, sinus pressure and trouble swallowing. Negative for dental problem, ear discharge, ear pain, facial swelling, nosebleeds, sneezing, sore throat, tinnitus and voice change.    Eyes: Negative for redness, itching and visual disturbance.   Respiratory: Positive for cough. Negative for choking, shortness of breath and wheezing.    Cardiovascular: Negative for chest pain and palpitations.   Gastrointestinal: Negative for abdominal pain.        Hiatal hernia and reflux as above   Musculoskeletal: Negative for gait problem.   Skin: Negative for rash.   Allergic/Immunologic: Positive for environmental allergies.   Neurological: Negative for dizziness, light-headedness and headaches.       Objective:      Physical Exam   Constitutional: She is oriented to person, place, and time. She appears well-developed and well-nourished. No distress.   HENT:   Head: Normocephalic and atraumatic.   Right Ear: Tympanic membrane, external ear and ear canal normal. Decreased hearing is  noted.   Left Ear: Tympanic membrane, external ear and ear canal normal. Decreased hearing is noted.   Nose: No mucosal edema, rhinorrhea, nasal deformity or septal deviation. No epistaxis. Right sinus exhibits maxillary sinus tenderness. Right sinus exhibits no frontal sinus tenderness. Left sinus exhibits maxillary sinus tenderness. Left sinus exhibits no frontal sinus tenderness.   Mouth/Throat: Uvula is midline, oropharynx is clear and moist and mucous membranes are normal. Mucous membranes are not pale and not dry. She has dentures (upper and lower). No oropharyngeal exudate or posterior oropharyngeal erythema.   Left debris and blood on the head of the left inferior turbinate   Eyes: Conjunctivae, EOM and lids are normal. Pupils are equal, round, and reactive to light. Right eye exhibits no chemosis. Left eye exhibits no chemosis. Right conjunctiva is not injected. Left conjunctiva is not injected. No scleral icterus. Right eye exhibits normal extraocular motion and no nystagmus. Left eye exhibits normal extraocular motion and no nystagmus.   Neck: Trachea normal and phonation normal. No tracheal tenderness present. No tracheal deviation present. No thyroid mass and no thyromegaly present.   Cardiovascular: Intact distal pulses.    Pulmonary/Chest: Effort normal. No stridor. No respiratory distress.   Abdominal: She exhibits no distension.   Lymphadenopathy:        Head (right side): No submental, no submandibular, no preauricular, no posterior auricular and no occipital adenopathy present.        Head (left side): No submental, no submandibular, no preauricular, no posterior auricular and no occipital adenopathy present.     She has no cervical adenopathy.   Neurological: She is alert and oriented to person, place, and time. No cranial nerve deficit.   Skin: Skin is warm and dry. No rash noted. No erythema.   Psychiatric: She has a normal mood and affect. Her behavior is normal.       Assessment:       1.  Acute sinusitis, recurrence not specified, unspecified location    2. Sensorineural hearing loss (SNHL) of both ears        Plan:       1.  Acute sinusitis:  Will treat again with oral Amoxil, it was well tolerated by her fifteen months ago and she is again having symptoms of acute sinusitis that has been progressing over the last several weeks.  Risks and benefits of antibiotic use were discussed, and she and her son agreed to the treatment plan.  2.  SNHL:  I would recommend she continue to follow with audiology, as she may need to have her hearing aids adjusted, has been over a year since last visit.
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
## 2017-05-24 PROBLEM — Z51.81 MEDICATION MONITORING ENCOUNTER: Status: ACTIVE | Noted: 2017-01-01

## 2017-05-24 NOTE — PROGRESS NOTES
Subjective:       Patient ID: Tika Nevarez is a 88 y.o. female.    Chief Complaint: Osteoporosis and Pain    Here for follow-up on OP due for prolia  Last 10/20/2016. No complications with calcium. On vitamin D 1000 IU daily. Doesn't really eat dairy because of GI side effects.      Diagnosed with osteoporosis ~20 yrs ago. Previously on Fosamax (developed abdominal pain). Then treated with Reclast x 5 yrs (no improvement). Switched to Prolia in '11 until '13 when it was stopped for a dental procedure. Was lost to f/u until 2/15 when she saw Dr. Iqbal. Resumed Prolia  3/2/15 - tolerated well. Was found to have a new T11 compression fracture in 3/15.  H/o ankle fracture ('80). +Vertebral fracture (T11; 3/15). +Family history of osteoporosis (parents and sisters). Hysterectomy in her late 20's (irregular bleeding); unsure when she went through menopause. No tobacco. Occasional alcohol (none recently). +Decrease height (1 inch over 3 yrs).    No falls or fractures since last visit. Mild unsteady gait walk well with the aid of a walker     Repeat DEXA today 5/24/17    Significant OA hands especially DIP, PIP and CMC joints, and spine.  She does have kyphosis of the mid back.      Review of Systems   Constitutional: Negative.  Negative for activity change, appetite change, chills, fatigue and fever.   HENT: Negative.  Negative for mouth sores and trouble swallowing.         No dry mouth   Eyes: Negative.  Negative for photophobia, pain and redness.        No swollen or red eyes, no dry eye     Respiratory: Negative.  Negative for chest tightness, shortness of breath, wheezing and stridor.    Cardiovascular: Negative.  Negative for chest pain.   Gastrointestinal: Negative.  Negative for abdominal pain, blood in stool, diarrhea, nausea and vomiting.   Genitourinary: Negative.  Negative for dysuria, frequency, hematuria and urgency.   Musculoskeletal: Positive for arthralgias, back pain and gait problem. Negative for  joint swelling, myalgias, neck pain and neck stiffness.        Stable with the aid of a walker   Skin: Negative.  Negative for color change, pallor and rash.   Neurological: Negative for weakness.   Hematological: Negative for adenopathy.   Psychiatric/Behavioral: Negative for suicidal ideas.         Objective:   /71   Pulse 86   Ht 5' (1.524 m)   Wt 47.8 kg (105 lb 6.1 oz)   LMP 05/15/1960   BMI 20.58 kg/m²      Physical Exam   Constitutional: She is oriented to person, place, and time and well-developed, well-nourished, and in no distress. No distress.   HENT:   Head: Normocephalic and atraumatic.   Right Ear: External ear normal.   Left Ear: External ear normal.   Mouth/Throat: No oropharyngeal exudate.   Eyes: Conjunctivae and EOM are normal. Pupils are equal, round, and reactive to light. No scleral icterus.   Neck: Normal range of motion. Neck supple. No thyromegaly present.   Cardiovascular: Normal rate, regular rhythm and normal heart sounds.    No murmur heard.  Pulmonary/Chest: Effort normal and breath sounds normal. She exhibits no tenderness.   Abdominal: Soft. Bowel sounds are normal.   Lymphadenopathy:     She has no cervical adenopathy.   Neurological: She is alert and oriented to person, place, and time. She displays normal reflexes. No cranial nerve deficit. She exhibits normal muscle tone. Gait normal.   Skin: Skin is warm and dry. No rash noted.     Musculoskeletal: She exhibits deformity. She exhibits no edema or tenderness.   Mod kyphosis mid back   Not TTP along spine   OA changes in her hand aashish ryanne cmc, pip and dip joints                Recent Results (from the past 168 hour(s))   CBC auto differential    Collection Time: 05/24/17 12:59 PM   Result Value Ref Range    WBC 5.75 3.90 - 12.70 K/uL    RBC 3.67 (L) 4.00 - 5.40 M/uL    Hemoglobin 12.0 12.0 - 16.0 g/dL    Hematocrit 36.8 (L) 37.0 - 48.5 %     (H) 82 - 98 fL    MCH 32.7 (H) 27.0 - 31.0 pg    MCHC 32.6 32.0 - 36.0 %     RDW 14.2 11.5 - 14.5 %    Platelets 183 150 - 350 K/uL    MPV 9.5 9.2 - 12.9 fL    Gran # 3.3 1.8 - 7.7 K/uL    Lymph # 1.8 1.0 - 4.8 K/uL    Mono # 0.4 0.3 - 1.0 K/uL    Eos # 0.2 0.0 - 0.5 K/uL    Baso # 0.03 0.00 - 0.20 K/uL    Gran% 57.2 38.0 - 73.0 %    Lymph% 31.5 18.0 - 48.0 %    Mono% 7.5 4.0 - 15.0 %    Eosinophil% 3.3 0.0 - 8.0 %    Basophil% 0.5 0.0 - 1.9 %    Differential Method Automated    Comprehensive metabolic panel    Collection Time: 05/24/17 12:59 PM   Result Value Ref Range    Sodium 139 136 - 145 mmol/L    Potassium 3.6 3.5 - 5.1 mmol/L    Chloride 96 95 - 110 mmol/L    CO2 33 (H) 23 - 29 mmol/L    Glucose 99 70 - 110 mg/dL    BUN, Bld 15 8 - 23 mg/dL    Creatinine 0.8 0.5 - 1.4 mg/dL    Calcium 9.8 8.7 - 10.5 mg/dL    Total Protein 7.6 6.0 - 8.4 g/dL    Albumin 3.6 3.5 - 5.2 g/dL    Total Bilirubin 0.5 0.1 - 1.0 mg/dL    Alkaline Phosphatase 48 (L) 55 - 135 U/L    AST 17 10 - 40 U/L    ALT 10 10 - 44 U/L    Anion Gap 10 8 - 16 mmol/L    eGFR if African American >60 >60 mL/min/1.73 m^2    eGFR if non African American >60 >60 mL/min/1.73 m^2   Sedimentation rate, manual    Collection Time: 05/24/17 12:59 PM   Result Value Ref Range    Sed Rate 20 0 - 20 mm/Hr     DEXA 5/24/17 total femur T score -1.5, femur neck -1.5, spine -2.5 impression improved bone density at the spine, stable at the hip       Assessment:       1. Age-related osteoporosis without current pathological fracture    2. Spondylosis of lumbar region without myelopathy or radiculopathy    3. Primary osteoarthritis involving multiple joints    4. Medication monitoring encounter          1.Osteoporosis with improved bone density now on Prolia ×2 years after 2 year hiatus lost to follow-up      hx of compression fracture-   Vit D at goal 3/2016.  On 1000 IU D3 daily- not on calcium supplements.     Diagnosed with osteoporosis ~20 yrs ago. Previously on Fosamax (developed abdominal pain). Then treated with Reclast x 5 yrs (no  improvement). Switched to Prolia in '11 until '13 when it was stopped for a dental procedure. Was lost to f/u 2013 until 2/15 when she saw Dr. Iqbal. Received Prolia again 3/2/15,  9/3/15, 3/28/2016, 11/20/16    Last dexa 12/2014 with decline in BMD so prolia was started.  Repeat bone density 5/24/17 stable at the hip 9% improvement at the spine- will continue Prolia    2.  Generalized osteoarthritis with predominant hand and spine involvement      3.  Medication monitoring --no chronic kidney disease, no hypocalcemia, no pros Prolia issues    Plan:       Discussed with patient's improvement in her bone density scores Prolia seems to be working well  Plan of care is to continue Prolia 60 mg every 6 months for the next 2 years and repeat her bone density at that time if she is now osteopenic will consider placing her on some type of a cyclic bisphosphonate therapy 2 to maintain bone density    Okay for Prolia administration today, no need for post calcium check she has normal renal function, calcium level normal  Continue her vitamin D 1000 international units in her dietary intake of calcium    She can use Tylenol for her joint issues    Continue use the assistance of her walker    Return to our clinic in 6 months with a complete metabolic panel and Prolia injection    Call with any questions, changes, or concerns.

## 2017-05-24 NOTE — PROGRESS NOTES
Administered 1cc Prolia 60mg/cc to RUQof abdomen. Labs reviewed: Calcium 9.8, Creatinine 0.8 . Pt tolerated well. No acute reaction noted to site. Pt instructed on S/S to report. Pt verbalized understanding.     Lot:[id number]  Exp:06/2019  Manu:Lolis

## 2017-05-24 NOTE — LETTER
May 24, 2017      Shanda Pandey MD  [address] [place] [address]           [facility]  [address] [place] [address]  Phone: [phone]  Fax: [phone]          Patient: Tika Nevarez   MR Number: [id number]   YOB: 1929   Date of Visit: 5/24/2017       Dear Dr. Shanda Pandey:    Thank you for referring Tika Nevarez to me for evaluation. Attached you will find relevant portions of my assessment and plan of care.    If you have questions, please do not hesitate to call me. I look forward to following Tika Nevarez along with you.    Sincerely,    Will Tucker, RAHEL    Enclosure  CC:  No Recipients    If you would like to receive this communication electronically, please contact [email or web address] or [phone] to request more information on [facility] access.    For providers and/or their staff who would like to refer a patient to [facility], please contact us through our one-stop-shop provider referral line, [facility], at [phone].    If you feel you have received this communication in error or would no longer like to receive these types of communications, please e-mail [email or web address]

## 2017-06-01 NOTE — ED PROVIDER NOTES
SCRIBE #1 NOTE: I, Evgeny Del Angel, am scribing for, and in the presence of, Edward Pandya MD. I have scribed the entire note.      History      Chief Complaint   Patient presents with    Altered Mental Status     son reports patient fell about 5 or 6 days ago, hitting head, also reports patient has been confused since that time, but may possibly have a UTI       Review of patient's allergies indicates:   Allergen Reactions    Alendronate      abdominal pains    Amoxicillin-pot clavulanate Nausea And Vomiting    Morphine      Difficulty breathing    Olmesartan      Nausea    Propoxyphene n-acetaminophen      Other reaction(s): Unable to obtain    Sulfa (sulfonamide antibiotics)      Stomach upset, Nausea        HPI   HPI    6/1/2017, 11:43 AM   History obtained from the son      History of Present Illness: Tika Nevarez is a 88 y.o. female patient who presents to the Emergency Department for an evaluation of EMS following fall which onset suddenly 6 days ago. Pt has reportedly told her daughter who looks after her that she felt more confused and possibly had a UTI. Symptoms are constant and moderate in severity. Exacerbated by nothing and relieved by nothing. Associated sxs include head injury, right shoulder pain, and confusion. Patient denies any LOC,  HA, dizziness, back pain, neck pain, knee pain, hip pain, abd pain, CP, SOB, facial droop, speech changes, and all other sxs at this time. No further complaints or concerns at this time.     Arrival mode: EMS    PCP: Olivia Benitez MD       Past Medical History:  Past Medical History:   Diagnosis Date    Anemia     Anxiety     Arthritis     Atrial fibrillation     Cervicalgia     followed [facility] at [facility]    CHF (congestive heart failure)     Colitis     Compression fracture     Coronary artery disease     Depression     Frequent UTI     Hypertension     Hypothyroidism     Lumbago     [facility]    Osteoporosis 12/8/2014    Peripheral vascular disease     Pneumonia     Pneumonia due to other specified bacteria     Spinal cord disease     Suicide and self-inflicted injury by other specified means 1986 approx    took 's antianxiety  more of unintentinal OD. per patient    Thoracic or lumbosacral neuritis or radiculitis, unspecified     [facility]    Trouble in sleeping     Urinary incontinence        Past Surgical History:  Past Surgical History:   Procedure Laterality Date    APPENDECTOMY  1958    BACK SURGERY      BREAST SURGERY Right 1959 approx    Bx - benign    EYE SURGERY Bilateral 2014    cataract w/ IOL Lizette Ghosh eye MD    HYSTERECTOMY  1958    IZABELA for fibroids , risk for cancer and prolapse    JOINT REPLACEMENT Bilateral 2000 approx    knee    pacemaker with defibrillator Left     SPINE SURGERY      lumbar disc x 2         Family History:  Family History   Problem Relation Age of Onset    Heart disease Mother     Dementia Father     Heart disease Brother     Dementia Brother     Stroke Sister     Mental illness Paternal Aunt     Hypertension Sister     Heart disease Sister     Cancer Neg Hx     Mental retardation Neg Hx     Diabetes Neg Hx     COPD Neg Hx     Asthma Neg Hx     Alcohol abuse Neg Hx     Drug abuse Neg Hx        Social History:  Social History     Social History Main Topics    Smoking status: Never Smoker    Smokeless tobacco: Never Used    Alcohol use No    Drug use: No    Sexual activity: No       ROS   Review of Systems   Constitutional: Negative for fever.   HENT: Negative for sore throat.    Respiratory: Negative for shortness of breath.    Cardiovascular: Negative for chest pain.   Gastrointestinal: Negative for nausea.   Genitourinary: Negative for dysuria.   Musculoskeletal: Negative for back pain.        (+) Head trauma  (+) Right shoulder pain   Skin: Negative for rash.   Neurological:  Negative for weakness.   Hematological: Does not bruise/bleed easily.   Psychiatric/Behavioral: Positive for confusion.     Physical Exam      Initial Vitals [06/01/17 1125]   BP Pulse Resp Temp SpO2   (!) 155/91 (!) 131 19 97.8 °F (36.6 °C) (!) 93 %      Physical Exam  Nursing Notes and Vital Signs Reviewed.  Constitutional: Patient is in no acute distress. Well-developed and well-nourished.  Head: Atraumatic. Normocephalic.  Eyes: PERRL. EOM intact. Conjunctivae are not pale. No scleral icterus.  ENT: Mucous membranes are moist. Oropharynx is clear and symmetric.    Neck: Supple. Full ROM. No lymphadenopathy.  Cardiovascular: Regular rate. Regular rhythm. No murmurs, rubs, or gallops. Distal pulses are 2+ and symmetric.  Pulmonary/Chest: No respiratory distress. Clear to auscultation bilaterally. No wheezing, rales, or rhonchi.  Abdominal: Soft and non-distended.  There is no tenderness.  No rebound, guarding, or rigidity. Good bowel sounds.  Genitourinary: No CVA tenderness  Musculoskeletal: Moves all extremities. No obvious deformities. No edema. No calf tenderness. Bony deformity to multiple joints consistent with dx of rheumatoid arthritis.   Skin: Warm and dry. Subacute bruising noted to right shoulder and upper chest wall.   Neurological:  Alert, awake, and appropriate.  Normal speech.  No acute focal neurological deficits are appreciated.  Psychiatric: Normal affect. Good eye contact. Appropriate in content.    ED Course    Critical Care  Date/Time: 6/1/2017 3:20 PM  Performed by: BATOOL COLEMAN  Authorized by: BATOOL COLEMAN   Direct patient critical care time: 15 minutes  Additional history critical care time: 5 minutes  Ordering / reviewing critical care time: 5 minutes  Documentation critical care time: 10 minutes  Consulting other physicians critical care time: 5 minutes  Consult with family critical care time: 10 minutes  Total critical care time (exclusive of procedural time) : 50 minutes  Critical  care was time spent personally by me on the following activities: development of treatment plan with patient or surrogate, discussions with consultants, evaluation of patient's response to treatment, examination of patient, obtaining history from patient or surrogate, ordering and performing treatments and interventions, ordering and review of laboratory studies, ordering and review of radiographic studies and re-evaluation of patient's condition.        ED Vital Signs:  Vitals:    06/01/17 1125 06/01/17 1135 06/01/17 1142 06/01/17 1157   BP: (!) 155/91  133/79 (!) 129/57   Pulse: (!) 131 91 110 103   Resp: 19  (!) 24 (!) 21   Temp: 97.8 °F (36.6 °C)      TempSrc: Oral      SpO2: (!) 93%  95% 98%   Weight: 47.6 kg (105 lb)      Height: 5' (1.524 m)       06/01/17 1227 06/01/17 1319 06/01/17 1419 06/01/17 1434   BP: (!) 122/53 (!) 136/56 135/77 (!) 140/68   Pulse: 93 88 88 86   Resp: 20 (!) 22 (!) 22 (!) 22   Temp:       TempSrc:       SpO2: 97% 95% 95% 98%   Weight:       Height:           Abnormal Lab Results:  Labs Reviewed   CBC W/ AUTO DIFFERENTIAL - Abnormal; Notable for the following:        Result Value    RBC 3.64 (*)     Hematocrit 35.8 (*)     MCH 33.0 (*)     All other components within normal limits   URINALYSIS - Abnormal; Notable for the following:     Protein, UA Trace (*)     Occult Blood UA Trace (*)     Urobilinogen, UA 4.0-6.0 (*)     All other components within normal limits   COMPREHENSIVE METABOLIC PANEL - Abnormal; Notable for the following:     Potassium 3.4 (*)     Calcium 8.3 (*)     Albumin 3.1 (*)     Alkaline Phosphatase 47 (*)     All other components within normal limits   TROPONIN I   PROTIME-INR   PROTIME-INR        All Lab Results:  Results for orders placed or performed during the hospital encounter of 06/01/17   CBC W/ AUTO DIFFERENTIAL   Result Value Ref Range    WBC 7.31 3.90 - 12.70 K/uL    RBC 3.64 (L) 4.00 - 5.40 M/uL    Hemoglobin 12.0 12.0 - 16.0 g/dL    Hematocrit 35.8 (L)  37.0 - 48.5 %    MCV 98 82 - 98 fL    MCH 33.0 (H) 27.0 - 31.0 pg    MCHC 33.5 32.0 - 36.0 %    RDW 14.2 11.5 - 14.5 %    Platelets 194 150 - 350 K/uL    MPV 9.4 9.2 - 12.9 fL    Lymph # CANCELED 1.0 - 4.8 K/uL    Mono # CANCELED 0.3 - 1.0 K/uL    Eos # CANCELED 0.0 - 0.5 K/uL    Baso # CANCELED 0.00 - 0.20 K/uL    Gran% 60.0 38.0 - 73.0 %    Lymph% 27.0 18.0 - 48.0 %    Mono% 10.0 4.0 - 15.0 %    Eosinophil% 3.0 0.0 - 8.0 %    Basophil% 0.0 0.0 - 1.9 %    Differential Method Manual    Urinalysis - Cath.   Result Value Ref Range    Specimen UA Urine, Catheterized     Color, UA Yellow Yellow, Straw, Teresa    Appearance, UA Clear Clear    pH, UA 6.0 5.0 - 8.0    Specific Gravity, UA 1.020 1.005 - 1.030    Protein, UA Trace (A) Negative    Glucose, UA Negative Negative    Ketones, UA Negative Negative    Bilirubin (UA) Negative Negative    Occult Blood UA Trace (A) Negative    Nitrite, UA Negative Negative    Urobilinogen, UA 4.0-6.0 (A) <2.0 EU/dL    Leukocytes, UA Negative Negative   Comprehensive metabolic panel   Result Value Ref Range    Sodium 138 136 - 145 mmol/L    Potassium 3.4 (L) 3.5 - 5.1 mmol/L    Chloride 102 95 - 110 mmol/L    CO2 27 23 - 29 mmol/L    Glucose 95 70 - 110 mg/dL    BUN, Bld 13 8 - 23 mg/dL    Creatinine 0.6 0.5 - 1.4 mg/dL    Calcium 8.3 (L) 8.7 - 10.5 mg/dL    Total Protein 7.0 6.0 - 8.4 g/dL    Albumin 3.1 (L) 3.5 - 5.2 g/dL    Total Bilirubin 0.7 0.1 - 1.0 mg/dL    Alkaline Phosphatase 47 (L) 55 - 135 U/L    AST 15 10 - 40 U/L    ALT 10 10 - 44 U/L    Anion Gap 9 8 - 16 mmol/L    eGFR if African American >60 >60 mL/min/1.73 m^2    eGFR if non African American >60 >60 mL/min/1.73 m^2   Troponin I   Result Value Ref Range    Troponin I 0.015 0.000 - 0.026 ng/mL   Protime-INR   Result Value Ref Range    Prothrombin Time 12.2 9.0 - 12.5 sec    INR 1.2 0.8 - 1.2         Imaging Results:  Imaging Results          CT Cervical Spine Without Contrast (In process)                CT Head Without  Contrast (Final result)  Result time 06/01/17 14:07:17    Final result by Beau Tamez MD (06/01/17 14:07:17)                 Impression:           Subtle hyperdensity in the right perimesencephalic cistern that in the setting of trauma is characteristic of subarachnoid hemorrhage.  These findings were observed at 1403 hours and reported to Dr. Pandya at 1406 hours on June 1, 2017.    All CT scans at this facility use dose modulation, iterative reconstruction and/or weight based dosing when appropriate to reduce radiation dose to as low as reasonably achievable.       Electronically signed by: BEAU TAMEZ MD  Date:     06/01/17  Time:    14:07              Narrative:    Exam:  CT HEAD WITHOUT CONTRAST    Clinical History:  Acute onset headache    Technique: Axial CT imaging was performed through the head without intravenous contrast.     Comparison:  10/29/16     Findings:     Age appropriate generalized cerebral and cerebellar atrophy. Mild, symmetric, low density changes in the periventricular white matter consistent with chronic small vessel ischemic change.  Remote small cortical right cerebellar infarct.  Subtle hyperdensity in the right perimesencephalic cistern.   No hydrocephalus, midline shift or mass effect.  The calvarium is intact.   Visualized paranasal sinuses and mastoid air cells are clear.   Atheromatous calcifications involve the bilateral cavernous carotid arteries.                             X-Ray Shoulder Complete 2 View Right (Final result)  Result time 06/01/17 13:05:28    Final result by Beau Tamez MD (06/01/17 13:05:28)                 Impression:         Mildly displaced distal right clavicle fracture.  Severe right shoulder DJD.        Electronically signed by: BEAU TAMEZ MD  Date:     06/01/17  Time:    13:05              Narrative:    Exam: XR SHOULDER COMPLETE 2 OR MORE VIEWS RIGHT    Clinical History:    Acute right shoulder pain. Initial encounter.    Findings:      Severe  right glenohumeral arthrosis and osteopenia.  There is a mildly displaced right distal clavicle fracture.  Mild periventricular arthrosis.                             The EKG was ordered, reviewed, and independently interpreted by the ED provider.  Interpretation time: 11:35  Rate: 91 BPM  Rhythm: Sinus rhythm with marked sinus arrhythmia with frequent ventricular-paced complexes  Interpretation: ST& Marked T wave abnormality. Prolonged QT. No STEMI.           The Emergency Provider reviewed the vital signs and test results, which are outlined above.    ED Discussion     2:08 PM: Dr. Pandya discussed the pt's case with Dr. Tamez (Radiology) who [place] [facility] [place] [facility] [place].    2:17 PM: Explained CT findings to pt and son at bedside as well as need to transfer pt to [facility] for further evaluation.     2:40 PM: Consult with Dr. Mills ([facility]) at [facility] concerning pt. There are no Neurosurgery services, which the patient requires, offered at [facility] at this time. Dr. Mills expresses understanding and will accept transfer for Neurosurgery.  Accepting Facility: [facility]  Accepting Physician: Dr. Mills    2:40 PM: Re-evaluated pt. Informed pt and family that there are no neurosurgery services available at this time. I have discussed test results, shared treatment plan, and the need for transfer with patient and family at bedside. All historical, clinical, radiographic, and laboratory findings were reviewed with the patient/family in detail. [facility] with care required en route. Patient understands that there could be unforeseen motor vehicle accidents or loss of vital signs that could result in potential death or permanent disability. Pt and family express understanding at this time and agree with all information. All questions answered. Pt and family have no further questions or concerns at  this time. Pt is ready for transfer.       ED Medication(s):  Medications   0.9%  NaCl infusion (0 mLs Intravenous Stopped 6/1/17 1312)       New Prescriptions    No medications on file             Medical Decision Making    Medical Decision Making:   Clinical Tests:   Lab Tests: Ordered and Reviewed  Radiological Study: Ordered and Reviewed  Medical Tests: Ordered and Reviewed           Scribe Attestation:   Scribe #1: I performed the above scribed service and the documentation accurately describes the services I performed. I attest to the accuracy of the note.    Attending:   Physician Attestation Statement for Scribe #1: I, Edward Pandya MD, personally performed the services described in this documentation, as scribed by Evgeny Del Angel, in my presence, and it is both accurate and complete.          Clinical Impression       ICD-10-CM ICD-9-CM   1. Accidental fall from bed, initial encounter W06.XXXA E884.4   2. Altered mental status R41.82 780.97   3. Fall, accidental W19.XXXA E888.9   4. SAH (subarachnoid hemorrhage) I60.9 430   5. Altered mental status, unspecified altered mental status type R41.82 780.97   6. Closed displaced fracture of acromial end of right clavicle, initial encounter S42.031A 810.03   7. Contusion of multiple sites of shoulder and upper arm, right, initial encounter S40.011A 923.09    S40.021A        Disposition:   Disposition: Transferred  Condition: Stable         Edward Pandya MD  06/01/17 1519       Edward Pandya MD  06/01/17 1520

## 2017-06-01 NOTE — ED NOTES
Ct scan showed subarachnoid hemorrhage. Plan to transfer to [facility] for neurology.

## 2017-06-01 NOTE — ED NOTES
"23 g "butterfly" needle used to draw a repeat bmp and troponin from pt's right ac. Blood brought to lab.  "

## 2017-06-01 NOTE — ED NOTES
Pt awake, alert, oriented x 3. Clear speech. No n/v/d. Denies pain. Pupils 2 mm equal and reactive. Follows commands. Mi. Hob 30 degrees. Bed low, locked, side rails up x 2. Family member at bedside.

## 2017-06-01 NOTE — TELEPHONE ENCOUNTER
Pt on schedule for 12:40pm this afternoon  Phoned and spoke with son, he said that his mother is somewhat confused, sleeping a lot, doesn't seem to be drinking enough water. Asked son who was the primary caretaker of his mother, he says that his mother spends 1 month with his sister and 1 month with him,they rotate. He says that he does not know if his mother is weak because she is with her sister. He also says that his mother is nauseated when she eats and thinks that she may be dehydrated.  I notified him that I spoke with  and she said that it would be best if they take her to the ER because she maybe septic. Son says that he will call his brother to take her. Call concluded

## 2017-06-08 NOTE — TELEPHONE ENCOUNTER
Spoke with Jean Christopher (son), notified of results and message   Verbalized understanding. Notified him next available appt will be in July  He declined would like something earlier,  I notified him that she can be placed on the waiting list but there is no guarantee that she will bee seen any earlier, I  Notified pt's son that he can call her ins for a list of ortho dr that may see her earlier. Notified him that if does find anyone that can see her to contact clinic and we will fax the referral

## 2017-06-08 NOTE — TELEPHONE ENCOUNTER
Please notify patient/family that her x-ray shows healed vs healing fracture of pubic ramus. This may be the cause of her pain and difficulty ambulating. Please schedule her with ortho for opinion/recommendations. Thank you.

## 2017-06-09 PROBLEM — I38 HEART VALVE REGURGITATION: Status: ACTIVE | Noted: 2017-01-01

## 2017-06-10 NOTE — PROGRESS NOTES
Subjective:       Patient ID: Tika Nevarez is a 88 y.o. female.    Chief Complaint: Follow-up (Hospital,Not Feeling Good,Not Eating Or Drinking Very Much,Legs Hurt)    Patient presents to clinic today for hospital follow up. She was recently seen in ER after a fall and ultimately transferred to [facility] for subdural hematoma. Neurosurgery evaluated patient and did not recommend intervention, except aspirin was discontinued. Caregivers reports that patient is not ambulating due to right hip pain and they are concerned she could have a fracture, they reports this area was not x-rayed when she presented. She had x-ray of right shoulder, revealing clavicular fracture, they reports sling was advised, no followup was advised.      Review of Systems   Constitutional: Negative.    Musculoskeletal: Positive for arthralgias.       Objective:      Physical Exam   Constitutional: She is oriented to person, place, and time. She appears well-developed. No distress.   Thin, elderly   HENT:   Head: Normocephalic and atraumatic.   Eyes: Conjunctivae and EOM are normal. Pupils are equal, round, and reactive to light. No scleral icterus.   Pulmonary/Chest: Effort normal.   Neurological: She is alert and oriented to person, place, and time.   Psychiatric: She has a normal mood and affect.   Vitals reviewed.      Assessment:       1. Right hip pain    2. Traumatic hemorrhage of cerebrum without loss of consciousness, unspecified laterality, subsequent encounter        Plan:   Tika was seen today for follow-up.    Diagnoses and all orders for this visit:    Right hip pain  Comments:  will obtain imaging to rule out fracture  Orders:  -     X-Ray Hip 2 View Right; Future    Traumatic hemorrhage of cerebrum without loss of consciousness, unspecified laterality, subsequent encounter  Comments:  discussed risks/benefits of aspirin given above and h/o a fib, they expressed understanding and agree with d/c of aspirin    - discharge summary  reviewed.

## 2017-06-11 NOTE — ED PROVIDER NOTES
SCRIBE #1 NOTE: I, Jayden Cantu, am scribing for, and in the presence of, Fred Rasheed Jr., MD. I have scribed the entire note.      History      Chief Complaint   Patient presents with    Fall     onto floor while sitting on bed at home, denies LOC, denies neck and back pain, c/o hematoma to right side of forehead, bleeding controlled at this time.       Review of patient's allergies indicates:   Allergen Reactions    Alendronate      abdominal pains    Amoxicillin-pot clavulanate Nausea And Vomiting    Morphine      Difficulty breathing    Olmesartan      Nausea    Propoxyphene n-acetaminophen      Other reaction(s): Unable to obtain    Sulfa (sulfonamide antibiotics)      Stomach upset, Nausea        HPI   HPI    6/11/2017, 8:00 AM   History obtained from the patient and pt's son      History of Present Illness: Tika Nevarez is a 88 y.o. female patient who presents to the Emergency Department for wound to the R side of the head secondary to fall which onset suddenly this AM. Pt's son reports pt rolled out of bed and fell onto floor. Symptoms are intermittent and moderate in severity. No mitigating or exacerbating factors reported. Associated sxs include LOC, neck pain, fatigue, head injury/trauma. Patient denies any dizziness, HA, light-headedness, extremity weakness/numbness, back pain, gain problems, n/v, abd pain, fever, chills, CP, SOB, and all other sxs at this time. No prior Tx reported. No further complaints or concerns at this time.         Arrival mode: EMS    PCP: Olivia Benitez MD       Past Medical History:  Past Medical History:   Diagnosis Date    Anemia     Anxiety     Arthritis     Atrial fibrillation     Cervicalgia     followed by PM&[facility] at [facility]    CHF (congestive heart failure)     Colitis     Compression fracture     Coronary artery disease     Depression     Frequent UTI     Hiatal hernia 7/12/2013    Hypertension     Hypothyroidism      Inguinal hernia     CT Abdomen/pelvis 10/29/2016---Left inguinal hernia containing loops of small bowel.    Lumbago     followed by [facility] at [facility]    Moderate aortic regurgitation 12/8/2014    Stable and treated with medications and followed by Cardiology     Non-rheumatic mitral regurgitation moderate-severe 9/16/2013    Osteoporosis 12/8/2014    Peripheral vascular disease     Pneumonia     Pneumonia due to other specified bacteria     Spinal cord disease     Suicide and self-inflicted injury by other specified means 1986 approx    took 's antianxiety  more of unintentinal OD. per patient    Thoracic or lumbosacral neuritis or radiculitis, unspecified     followed by PM[facility] at [facility]    Trouble in sleeping     Urinary incontinence        Past Surgical History:  Past Surgical History:   Procedure Laterality Date    APPENDECTOMY  1958    BACK SURGERY      BREAST SURGERY Right 1959 approx    Bx - benign    EYE SURGERY Bilateral 2014    cataract w/ IOL Lizette Ghosh eye MD    HYSTERECTOMY  1958    IZABELA for fibroids , risk for cancer and prolapse    JOINT REPLACEMENT Bilateral 2000 approx    knee    pacemaker with defibrillator Left     SPINE SURGERY      lumbar disc x 2         Family History:  Family History   Problem Relation Age of Onset    Heart disease Mother     Dementia Father     Heart disease Brother     Dementia Brother     Stroke Sister     Mental illness Paternal Aunt     Hypertension Sister     Heart disease Sister     Cancer Neg Hx     Mental retardation Neg Hx     Diabetes Neg Hx     COPD Neg Hx     Asthma Neg Hx     Alcohol abuse Neg Hx     Drug abuse Neg Hx        Social History:  Social History     Social History Main Topics    Smoking status: Never Smoker    Smokeless tobacco: Never Used    Alcohol use No    Drug use: No    Sexual activity: No       ROS   Review of Systems   Constitutional: Positive for fatigue. Negative  for chills and fever.   HENT: Negative for sore throat.    Respiratory: Negative for shortness of breath.    Cardiovascular: Negative for chest pain.   Gastrointestinal: Negative for abdominal pain, blood in stool, constipation, nausea and vomiting.   Genitourinary: Negative for dysuria and hematuria.   Musculoskeletal: Positive for neck pain. Negative for back pain and gait problem.   Skin: Positive for wound (R side of head ). Negative for rash.   Neurological: Positive for syncope. Negative for dizziness, speech difficulty, weakness, light-headedness, numbness and headaches.        (+) Head trauma/injury   Hematological: Does not bruise/bleed easily.   All other systems reviewed and are negative.      Physical Exam      Initial Vitals [06/11/17 0740]   BP Pulse Resp Temp SpO2   126/86 (!) 114 16 98.1 °F (36.7 °C) 98 %      Physical Exam  Nursing Notes and Vital Signs Reviewed.  Constitutional: Patient is in no acute distress. Awake and alert. Appropriate for age.   Head: Atraumatic. No facial instability or step-offs.  Eyes: PERRL. EOM normal. Conjunctivae normal.   HENT: Moist mucous membranes. No epistaxis. Patent airway.   Neck: No midline bony tenderness, deformities, or step-offs   Cardiovascular: Irregular regular rate which is constant with her hx of A-Fib and rhythm. Heart sounds are normal. Intact distal pulses   Pulmonary/Chest: No respiratory distress. Breath sounds are normal. No decreased breath sounds. Chest wall is stable.   Abdominal: Soft and non-distended. Non-tender.   Back: No abrasions or ecchymosis. No midline bony tenderness to the T-spine or L-spine. No deformities or step-offs.   Musculoskeletal: Full range of motion in bilateral extremities. No obvious deformities.   Skin: Normal color. No cyanosis. No lacerations. Abrasion to R side of the head. Old contusion from a previous fall.    Neurological: Awake and alert. Appropriate for age. GCS 15. Normal speech. Motor strength is normal at  "5/5 bilaterally. Non-focal neurological examination.        ED Course    Procedures  ED Vital Signs:  Vitals:    06/11/17 0740 06/11/17 0821 06/11/17 1003 06/11/17 1203   BP: 126/86 (!) 143/88 (!) 142/76 131/63   Pulse: (!) 114 104 105 110   Resp: 16 (!) 28 (!) 24 (!) 26   Temp: 98.1 °F (36.7 °C)  97.9 °F (36.6 °C)    TempSrc: Oral      SpO2: 98% 97% 95% 95%   Weight: 47.6 kg (105 lb)      Height: 4' 8" (1.422 m)       06/11/17 1218 06/11/17 1223   BP: (!) 142/77    Pulse: (!) 114    Resp: (!) 29    Temp:  98.3 °F (36.8 °C)   TempSrc:  Oral   SpO2: 96%    Weight:     Height:         Abnormal Lab Results:  Labs Reviewed   CBC W/ AUTO DIFFERENTIAL - Abnormal; Notable for the following:        Result Value    RBC 3.52 (*)     Hemoglobin 11.5 (*)     Hematocrit 34.1 (*)     MCH 32.7 (*)     RDW 14.7 (*)     MPV 8.6 (*)     All other components within normal limits   COMPREHENSIVE METABOLIC PANEL - Abnormal; Notable for the following:     Potassium 3.4 (*)     Albumin 3.3 (*)     All other components within normal limits   B-TYPE NATRIURETIC PEPTIDE - Abnormal; Notable for the following:      (*)     All other components within normal limits   URINALYSIS - Abnormal; Notable for the following:     Protein, UA Trace (*)     Ketones, UA Trace (*)     Occult Blood UA Trace (*)     Nitrite, UA Positive (*)     All other components within normal limits   URINALYSIS MICROSCOPIC - Abnormal; Notable for the following:     Bacteria, UA Many (*)     All other components within normal limits   TROPONIN I   TROPONIN I        All Lab Results:  Assay                                  Result                   Unit  _BNPSTAT                          521.4                    Pg/mL  WBC                                    7.55                      10^3/uL  HGB                                     11.5                       g/dL  HCT                                      34.1                      %  PLT                                    "    230                      10^3/uL  CreaC                                   0.63                      mg/dL  Tnl II                                      0.021                    Ng/mL  Urea                                      12                         Mg/dL      Imaging Results:  Imaging Results          X-Ray Hip 2 View Right (Final result)  Result time 06/11/17 09:54:13    Final result by KELLEN Hills Sr., MD (06/11/17 09:54:13)                 Impression:      1.  There is no acute fracture visualized.  An old fracture is again visualized in the right inferior pubic ramus.   2.  There is a moderate amount of atherosclerosis.      Electronically signed by: KELLEN HILLS MD  Date:     06/11/17  Time:    09:54              Narrative:    2 view x-ray of the right hip    Clinical History:     Pain in the right hip status post fall    Findings:     Comparison was made to prior examination performed on 6/7/2017.  There is no acute fracture visualized.  An old fracture is again visualized in the right inferior pubic ramus.  There is no dislocation.  There is a moderate amount of atherosclerosis.                             X-Ray Chest AP Portable (Final result)  Result time 06/11/17 08:29:28    Final result by Nelson Stack Jr., MD (06/11/17 08:29:28)                 Impression:     There is an acute fracture of distal right clavicle.  Small left pleural effusion with enlargement of the cardiopericardial silhouette.      Electronically signed by: NELSON STACK M.D.  Date:     06/11/17  Time:    08:29              Narrative:    XR CHEST AP PORTABLE    Clinical history: fall    Comparison: Previous frontal view of the chest from 10/31/2016 was reviewed.    Findings: The cardiopericardial silhouette remains enlarged.  There is a left-sided AICD in place. There is trace pleural fluid on the left. No confluent airspace opacity.  There is an acute fracture of the distal aspect of the right clavicle.                              CT Head Without Contrast (Final result)  Result time 06/11/17 08:25:18    Final result by Nelson Stack Jr., MD (06/11/17 08:25:18)                 Impression:         1.  No acute intracranial CT abnormality.    2.  There is soft tissue contusion about the right frontal scalp.  No underlying fracture.    3.  There is patchy low density involving the periventricular and deep white matter which likely relates to chronic microvascular ischemic change.  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    All CT scans at this facility use dose modulation, iterative reconstruction, and/or weight base dosing when appropriate to reduce radiation dose to as low as reasonably achievable.       Electronically signed by: NELSON STACK M.D.  Date:     06/11/17  Time:    08:25              Narrative:    EXAM: [id number] HEAD WITHOUT CONTRAST    CLINICAL HISTORY: Fall, head injury.    TECHNIQUE: Contiguous axial images were obtained from the skull base through the vertex without intravenous contrast.    COMPARISON: Previous CT of the head from 06/01/2017.    FINDINGS: No intracranial hemorrhage. No mass effect or midline shift. No extra axial fluid collections. There is soft tissue contusion about right scalp.  No underlying fracture.  Chronic infarcts involve the superior, peripheral aspect of the right cerebellar hemisphere.  There is low-density involving the periventricular and deep white matter.  This is nonspecific but likely relates to chronic microvascular ischemic disease. The ventricles and sulci are normal in size and configuration. There is no evidence of hydrocephalus. The pineal region is unremarkable.The paranasal sinuses and mastoid air cells are clear. No fractures are identified. No concerning osseous lesions.                             The EKG was ordered, reviewed, and independently interpreted by the ED provider.  Interpretation time: 0835  Rate:  106 BPM  Rhythm: atrial fibrillation  Interpretation: Moderate voltage criteria for LVH, may be normal variant. No STEMI.           The Emergency Provider reviewed the vital signs and test results, which are outlined above.    ED Discussion     12:18 PM: Re-evaluated pt. Pt is resting comfortably and is in no acute distress.  Pt states she has a previous hx of a compression fracture to the R hip and she declined a sling.  D/w pt all pertinent results. D/w pt any concerns expressed at this time. Answered all questions. Pt expresses understanding at this time.    Trauma precautions were discussed with patient and/or family/caretaker; I do not specifically detect any abdominal, thoracic, CNS, orthopedic, or other emergent or life threatening condition and that patient is safe to be discharged.  It was also discussed that despite an unrevealing examination and negative radiographic examination for serious or life threatening injury, these conditions may still exist.  As such, patient should return to ED immediately should they experience, severe or worsening pain, shortness of breath, abdominal pain, headache, vomiting, or any other concern.  It was also discussed that not infrequently, injuries may not be diagnosed during the initial ED visit (such as fractures) and that if the patient discovers a new area of concern, a new area of injury that was not evaluated in the ED, they should return for evaluation as they may have an injury that requires treatment.      ED Medication(s):  Medications   sodium chloride 0.9% bolus 500 mL (0 mLs Intravenous Stopped 6/11/17 1004)       Discharge Medication List as of 6/11/2017 12:23 PM          Follow-up Information     Olivia Benitez MD. Schedule an appointment as soon as possible for a visit in 1 week.    Specialty:  Family Medicine  Contact information:  [address] [place] [address] [address] [place] [address]  [phone]             [facility] - .    Specialty:   Emergency Medicine  Why:  As needed, If symptoms worsen  Contact information:  [address] [facility] [address] [phone]                   Medical Decision Making    Medical Decision Making:   Clinical Tests:   Lab Tests: Ordered and Reviewed  Radiological Study: Ordered and Reviewed  Medical Tests: Ordered and Reviewed           Scribe Attestation:   Scribe #1: I performed the above scribed service and the documentation accurately describes the services I performed. I attest to the accuracy of the note.    Attending:   Physician Attestation Statement for Scribe #1: I, Fred Rasheed Jr., MD, personally performed the services described in this documentation, as scribed by Jayden Cantu, in my presence, and it is both accurate and complete.          Clinical Impression       ICD-10-CM ICD-9-CM   1. Closed nondisplaced fracture of acromial end of right clavicle with routine healing, subsequent encounter S42.034D V54.11   2. Head trauma S09.90XA 959.01   3. Contusion of scalp, initial encounter S00.03XA 920   4. Scalp abrasion, initial encounter S00.01XA 910.0       Disposition:   Disposition: Discharged  Condition: Stable         Fred Rasheed Jr., MD  06/15/17 0618    Pt previously diagnosed with clavicular fracture from previous ER visit on 6/1/17.     Fred Rasheed Jr., MD  06/25/17 0902

## 2017-06-11 NOTE — ED NOTES
Patient placed on continuous cardiac monitor, automatic blood pressure cuff and continuous pulse oximeter. NSR, irregular with periods of sinus tachycardia, Pulse: 101

## 2017-06-11 NOTE — DISCHARGE INSTRUCTIONS
Regarding CONTUSIONS, I advised patient to: REST the injured area or use it less than usual; apply ICE to decrease swelling and pain and help prevent tissue damage; use COMPRESSION with an elastic bandage to support the area and decrease swelling; ELEVATE injured body part above the level of the heart to help decrease pain and swelling; AVOID using massage or massage to acute injuries as it may slow healing of the area; AVOID drinking alcohol as it may slow healing of the injury; and avoid stretching injured muscles. Advised patient to return to the emergency department or contact primary care provider if: having trouble moving injured area; notice tingling or numbness in or near the injured area; extremity below the bruise gets cold or turns pale; a new lump develops in the injured area; symptoms do not improve with treatment after 4 to 5 days; there is any questions or concerns about the condition or treatment plan.     The patient has family members that will observe him/her over the next 24 hours and that are competent to bring him/her back to the Emergency Department if concerning signs or symptoms develop. The family members are comfortable with this responsibility.  I have given detailed written and verbal instructions to the family to bring the patient back to the ED should any concerning signs such as excessive sleepiness, lethargy, confusion, unequal pupils, recurrent vomiting, seizure activity, loss of consciousness, or focal weakness develop.

## 2017-06-12 NOTE — TELEPHONE ENCOUNTER
----- Message from Fay Monreal sent at 6/12/2017 12:54 PM CDT -----  Contact: [facility]  Patient had another fall and will need to come in, but she will call you back because she is going to have to check with her brother about when she can come in.   Call her at [phone].                                     [email or web address]

## 2017-06-14 NOTE — TELEPHONE ENCOUNTER
I would not recommend waiting. Urgent care is an option if they feel she has minor injuries. If she hit her head, she needs to go to the ER.

## 2017-06-15 NOTE — PHYSICIAN QUERY
PT Name: Tika Nevarez  MR #: [id number]     Physician Query Form - Documentation Clarification      CDS/: Smita Mortensen               Contact information: [email or web address]    This form is a permanent document in the medical record.     Query Date: Lucia 15, 2017    By submitting this query, we are merely seeking further clarification of documentation. Please utilize your independent clinical judgment when addressing the question(s) below.    The Medical record reflects the following:    Supporting Clinical Findings Location in Medical Record     Please clarify episode of are for clavicle fx. Patient states previous hip fx and it's documented patient declined a sling. Dx documentation indicates subsequent episode for clavicle fx, however impression on xray report is acute fx of distal end clavicle.        ED Provider Notes and Xray reports.                                                                                      Doctor, Please specify diagnosis or diagnoses associated with above clinical findings.    Provider Use Only                                                                                                                               [  ] Clinically undetermined  Fracture previously dx'd on 6/1/17.  Pt is declining treatment for this, saying she has already been treated and does not want a sling.

## 2017-06-29 NOTE — TELEPHONE ENCOUNTER
----- Message from Fay Monreal sent at 6/29/2017  1:51 PM CDT -----  Contact: [facility]  She is checking the status on a prescription they faxed a couple days ago for Metoprolol 100 mgs.  She was one of Dr Ulrich's patients.   Call her at [phone].                                         [email or web address]

## 2017-07-06 NOTE — TELEPHONE ENCOUNTER
----- Message from Corinna Yadav sent at 7/6/2017  3:29 PM CDT -----  Contact: [facility]   [facility] [phone],,, medication is diltiazem 300mjg ext release,,,, pt needs a refilll,,, please call back

## 2017-07-08 NOTE — ED PROVIDER NOTES
SCRIBE #1 NOTE: I, Pretty Martinez/Evgeny Ayala, am scribing for, and in the presence of, Phillip Ayala MD. I have scribed the entire note.      History      Chief Complaint   Patient presents with    Fall       Review of patient's allergies indicates:   Allergen Reactions    Alendronate      abdominal pains    Amoxicillin-pot clavulanate Nausea And Vomiting    Codeine     Morphine      Difficulty breathing    Olmesartan      Nausea    Propoxyphene n-acetaminophen      Other reaction(s): Unable to obtain    Sulfa (sulfonamide antibiotics)      Stomach upset, Nausea    Zofran [ondansetron hcl (pf)]         HPI   HPI    7/8/2017, 9:33 AM   History obtained from the patient and son.      History of Present Illness: Tika Nevarez is a 88 y.o. female patient who presents to the Emergency Department for head pain which onset suddenly around 7:30AM after falling out of bed. Patient said that she fell on the back of her head. Symptoms are constant and moderate in severity.  No mitigating or exacerbating factors reported. Associated sxs include mild neck pain. Patient denies any LOC, SOB, fever, CP cough, nausea, emesis, weakness, numbness, dizziness, back pain, knee pain, hip pain, abd pain, and all other sxs at this time. Pt's son denies pt being on blood thinners. No further complaints or concerns at this time.       Arrival mode: Personal vehicle    PCP: Olivia Benitez MD       Past Medical History:  Past Medical History:   Diagnosis Date    Anemia     Anxiety     Arthritis     Atrial fibrillation     Bacterial pneumonia nec     Cervicalgia     followed by PM[facility] at [facility]    CHF (congestive heart failure)     Colitis     Compression fracture     Coronary artery disease     Depression     Frequent UTI     Hiatal hernia 7/12/2013    Hypertension     Hypothyroidism     Inguinal hernia     CT Abdomen/pelvis 10/29/2016---Left inguinal hernia containing loops of small bowel.     Lumbago     followed by [facility] at [facility]    Moderate aortic regurgitation 12/8/2014    Stable and treated with medications and followed by Cardiology     Non-rheumatic mitral regurgitation moderate-severe 9/16/2013    Osteoporosis 12/8/2014    Peripheral vascular disease     Pneumonia     Spinal cord disease     Suicide and self-inflicted injury by other specified means 1986 approx    took 's antianxiety  more of unintentinal OD. per patient    Thoracic or lumbosacral neuritis or radiculitis, unspecified     followed by PM[facility] at [facility]    Trouble in sleeping     Urinary incontinence        Past Surgical History:  Past Surgical History:   Procedure Laterality Date    APPENDECTOMY  1958    BACK SURGERY      BREAST SURGERY Right 1959 approx    Bx - benign    EYE SURGERY Bilateral 2014    cataract w/ IOL Lizette Ghosh eye MD    HYSTERECTOMY  1958    IZABELA for fibroids , risk for cancer and prolapse    JOINT REPLACEMENT Bilateral 2000 approx    knee    pacemaker with defibrillator Left     SPINE SURGERY      lumbar disc x 2         Family History:  Family History   Problem Relation Age of Onset    Heart disease Mother     Dementia Father     Heart disease Brother     Dementia Brother     Stroke Sister     Mental illness Paternal Aunt     Hypertension Sister     Heart disease Sister     Cancer Neg Hx     Mental retardation Neg Hx     Diabetes Neg Hx     COPD Neg Hx     Asthma Neg Hx     Alcohol abuse Neg Hx     Drug abuse Neg Hx        Social History:  Social History     Social History Main Topics    Smoking status: Never Smoker    Smokeless tobacco: Never Used    Alcohol use No    Drug use: No    Sexual activity: No       ROS   Review of Systems   Constitutional: Negative for fever.   HENT: Negative for sore throat.    Respiratory: Negative for cough and shortness of breath.    Cardiovascular: Negative for chest pain.   Gastrointestinal: Negative  for abdominal pain, nausea and vomiting.   Genitourinary: Negative for dysuria.   Musculoskeletal: Positive for neck pain. Negative for back pain.        (-) knee pain  (-) hip pain   Skin: Negative for rash.   Neurological: Positive for headaches. Negative for dizziness, syncope, weakness and numbness.   Hematological: Does not bruise/bleed easily.   All other systems reviewed and are negative.    Physical Exam      Initial Vitals [07/08/17 0848]   BP Pulse Resp Temp SpO2   (!) 149/69 72 16 97.7 °F (36.5 °C) (!) 94 %      MAP       95.67          Physical Exam  Nursing Notes and Vital Signs Reviewed.  Constitutional: Patient is in no apparent distress. Well-developed and well-nourished.  Head: Normocephalic. Hematoma on vertex and frontal region.  Eyes: PERRL. EOM intact. Conjunctivae are not pale. No scleral icterus.  ENT: Mucous membranes are moist. Oropharynx is clear and symmetric.    Neck: Supple. No cervical midline bony tenderness, deformities, or step-offs.   Cardiovascular: Regular rate. Regular rhythm. No murmurs, rubs, or gallops. Distal pulses are 2+ and symmetric.  Pulmonary/Chest: No respiratory distress. Clear to auscultation bilaterally. No wheezing, rales, or rhonchi.  Abdominal: Soft and non-distended.  There is no tenderness.  No rebound, guarding, or rigidity. Good bowel sounds.  Genitourinary: No CVA tenderness  Musculoskeletal: Moves all extremities. No obvious deformities. No edema. No calf tenderness.  Back: No midline bony tenderness, deformities, or step-offs of the T-spine or L-spine. Skin appears normal without abrasions or bruising. No erythema, induration, or fluctuance.   Skin: Warm and dry. L shoulder bruise (old).   Neurological:  Alert, awake, and appropriate.  Normal speech.  No acute focal neurological deficits are appreciated.  Psychiatric: Normal affect. Good eye contact. Appropriate in content.    ED Course    Procedures  ED Vital Signs:  Vitals:    07/08/17 0848 07/08/17 1000  07/08/17 1202   BP: (!) 149/69 139/81 135/79   Pulse: 72 78 70   Resp: 16 18 19   Temp: 97.7 °F (36.5 °C) 97.9 °F (36.6 °C) 98 °F (36.7 °C)   TempSrc: Oral  Oral   SpO2: (!) 94% 95% 97%   Weight: 47.6 kg (105 lb)     Height: 5' (1.524 m)         Imaging Results:  Imaging Results          CT Head Without Contrast (Final result)  Result time 07/08/17 11:11:21    Final result by KELLEN Hills Sr., MD (07/08/17 11:11:21)                 Impression:      1.  There is a small scalp hematoma overlying the right side of the posterior superior aspect of the skull.   2.  There is no calvarial fracture or intracranial hemorrhage.  3.  There are encephalomalacic changes in the right cerebellar hemisphere.  There are mild bilateral periventricular ischemic white matter changes.  There is no evidence of an acute ischemic event.    All CT scans at this facility use dose modulation, iterative reconstruction, and/or weight base dosing when appropriate to reduce radiation dose when appropriate to reduce radiation dose to as low as reasonably achievable.      Electronically signed by: KELLEN HILLS MD  Date:     07/08/17  Time:    11:11              Narrative:    CT of Head without IV contrast    History:     Headache status post fall    Technique: Standard brain CT protocol without IV contrast was performed.     Finding: Comparison was made to prior examination performed on 6/11/2017.  There are encephalomalacic changes in the right cerebellar hemisphere.  There are mild bilateral periventricular ischemic white matter changes.  There is a small scalp hematoma overlying the right side of the posterior superior aspect of the skull.  There is no calvarial fracture or intracranial hemorrhage.  The paranasal sinuses are normal in appearance.                                      The Emergency Provider reviewed the vital signs and test results, which are outlined above.    ED Discussion     11:15 AM: Reassessed pt at this time.  Pt states  her condition has improved at this time. Discussed with pt all pertinent ED information and results. Discussed pt dx and plan of tx. Gave pt all f/u and return to the ED instructions. All questions and concerns were addressed at this time. Pt expresses understanding of information and instructions, and is comfortable with plan to discharge. Pt is stable for discharge.    I discussed with patient and/or family/caretaker that evaluation in the ED does not suggest any emergent or life threatening medical conditions requiring immediate intervention beyond what was provided in the ED, and I believe patient is safe for discharge.  Regardless, an unremarkable evaluation in the ED does not preclude the development or presence of a serious of life threatening condition. As such, patient was instructed to return immediately for any worsening or change in current symptoms.    Trauma precautions were discussed with patient and/or family/caretaker; I do not specifically detect any abdominal, thoracic, CNS, orthopedic, or other emergent or life threatening condition and that patient is safe to be discharged.  It was also discussed that despite an unrevealing examination and negative radiographic examination for serious or life threatening injury, these conditions may still exist.  As such, patient should return to ED immediately should they experience, severe or worsening pain, shortness of breath, abdominal pain, headache, vomiting, or any other concern.  It was also discussed that not infrequently, injuries may not be diagnosed during the initial ED visit (such as fractures) and that if the patient discovers a new area of concern, a new area of injury that was not evaluated in the ED, they should return for evaluation as they may have an injury that requires treatment.      ED Medication(s):  Medications   acetaminophen tablet 650 mg (650 mg Oral Given 7/8/17 1202)       Discharge Medication List as of 7/8/2017 11:19 AM           Follow-up Information     Olivia Benitez MD In 2 days.    Specialty:  Family Medicine  Contact information:  [address]  [place] [address] [phone]             [facility] - .    Specialty:  Emergency Medicine  Why:  As needed  Contact information:  [address]  [facility] [address] [phone]                   Medical Decision Making    Medical Decision Making:   Clinical Tests:   Radiological Study: Ordered and Reviewed           Scribe Attestation:   Scribe #1: I performed the above scribed service and the documentation accurately describes the services I performed. I attest to the accuracy of the note.    Attending:   Physician Attestation Statement for Scribe #1: I, Phillip Ayala MD, personally performed the services described in this documentation, as scribed by Pretty Martinez/Evgeny Ayala, in my presence, and it is both accurate and complete.          Clinical Impression       ICD-10-CM ICD-9-CM   1. Fall, initial encounter W19.XXXA E888.9   2. Head injury due to trauma, initial encounter S09.90XA 959.01       Disposition:   Disposition: Discharged  Condition: Stable         Phillip Ayala MD  07/09/17 0837

## 2017-07-08 NOTE — ED NOTES
Family c/o multiple hematomas to scalp after falling out of the bed this morning.    Patient identifiers verified and correct for Tika Nevarez.    LOC: The patient is awake, alert and aware of environment with an appropriate affect, the patient is oriented to person and situation and speaking appropriately.  APPEARANCE: Patient resting comfortably and in no acute distress, patient is clean and well groomed, patient's clothing is properly fastened.  SKIN: The skin is warm and dry, color consistent with ethnicity, patient has normal skin turgor and moist mucus membranes, skin intact, no breakdown noted. Two hematomas to scalp, one hematoma to forehead, with bruising and swelling.  MUSCULOSKELETAL: Patient moving all extremities spontaneously.  RESPIRATORY: Airway is open and patent, respirations are spontaneous.  CARDIAC: Patient has a normal rate, no periphreal edema noted, capillary refill < 3 seconds.  ABDOMEN: Soft and non tender to palpation.

## 2017-07-16 NOTE — PROGRESS NOTES
Subjective:       Patient ID: Tika Nevarez is a 88 y.o. female.    Chief Complaint: Follow-up (ER)    Patient presents to clinic today with her son and daughter-in-law for ER follow up. Patient fell out of her bed on July 8th. She has scalp hematoma. Son reports that they are planning nursing home placement for patient. They did get bedrails to prevent her falling out of bed. They are not interested in order for hospital bed. Patient reports head pain at site of hematoma, which is stable. Patient is otherwise without concerns today.        Review of Systems   Constitutional: Negative for activity change and unexpected weight change.   HENT: Negative for hearing loss, rhinorrhea and trouble swallowing.    Eyes: Negative for discharge and visual disturbance.   Respiratory: Negative for chest tightness and wheezing.    Cardiovascular: Positive for palpitations. Negative for chest pain.   Gastrointestinal: Negative for blood in stool, constipation, diarrhea and vomiting.   Endocrine: Negative for polydipsia and polyuria.   Genitourinary: Negative for difficulty urinating, dysuria, hematuria and menstrual problem.   Musculoskeletal: Positive for arthralgias and neck pain. Negative for joint swelling.   Neurological: Positive for weakness and headaches.   Psychiatric/Behavioral: Negative for confusion and dysphoric mood.       Objective:      Physical Exam   Constitutional: She is oriented to person, place, and time. She appears well-developed and well-nourished. No distress.   HENT:   Head:       Eyes: Conjunctivae and EOM are normal. Pupils are equal, round, and reactive to light. No scleral icterus.   Neurological: She is alert and oriented to person, place, and time. No cranial nerve deficit. Gait normal.   Psychiatric: She has a normal mood and affect.       Assessment:       1. Scalp hematoma, subsequent encounter    2. Falls frequently    3. Frail elderly        Plan:   Tika was seen today for  follow-up.    Diagnoses and all orders for this visit:    Scalp hematoma, subsequent encounter    Falls frequently    Frail elderly    - Advised paitent/family they can add tylenol 325 mg every 8 hours with her norco that she is taking (no more than 650 mg of tylenol every 8 hours total). Advised son that we can assist with paperwork for nursing home when ready.

## 2017-07-19 NOTE — PROGRESS NOTES
Left Widex Clear 200 Fusion aid is back from repair.   had to replace all components and unable to save programming.  Aid requires re-programming and new feedback test.  It is very difficult for patient to come into office, limited mobility due to age and health.  I re-programmed aids to her previous session and used generalized software feedback settings, set aid at Acclimitization Level 2.  I notified her son-in-law, Jean, aid is ready to be picked up and of programming status.  If feedback in patient ear is problematic, he may return aid to have me lower gain or bring patient in for a feedback test.  Note: She lost her other aid so ok to have unpaired programming.     Left C200 Fusion  #2M L, xS power dome  Repair warranty expires: 07/14/2018

## 2017-07-26 NOTE — TELEPHONE ENCOUNTER
----- Message from Tiara Mullen sent at 7/26/2017 12:02 PM CDT -----  Thao with On license of [facility] at [phone]//states is calling to let dr know that [place] [facility]//pt's son is taking her//pt is weak/pale and when she stands up her bp drops  To 80 over 50//heart rate 99 irregular//no fever//weight is down to 99//dehydrated//she sees a big decline from last week//please call if any questions//thanks/llh
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
## 2017-08-03 NOTE — PROGRESS NOTES
For your information:    The following patient has been assigned to Pretty Martínez RN with Outpatient Complex Care Management for high risk screening.    Reason: High Risk    Please contact [facility] at [phone] with any questions.    Thank you,  Laney Ni

## 2017-08-07 NOTE — PROGRESS NOTES
I spoke with patient family, Jean and Domitila, on the phone.  Ms. Nevarez has also lost left hearing aid; right lost some time ago.  I discussed amplification options includin. Pocket talker or [employer] 3-band amplifier   2. Custom Basic Hearing aid for one ear with an [place]-Clip.  Audiogram/Impression will be required    They are still looking for the aid.  They will call back for appointment as needed.

## 2017-08-07 NOTE — PROGRESS NOTES
Subjective:       Patient ID: Tika Nevarez is a 88 y.o. female.    Chief Complaint: Sinus Problem    Patient is a very pleasant 87 year old female here to see me today for evaluation of possible sinusitis.  She says that she has been feeling poorly for the last several weeks, and is having increased nasal congestion and drainage.  She is blowing her nose, and is having thick yellow to green mucus.  She has been using cough drops nearly constantly to soothe her throat.  She has had some intermittent low grade fevers (99).  She is on Zyrtec daily, is not currently using Flonase.  I last saw her about 5 months ago with similar symptoms, and that did not improve with Amoxil, finally resolved with oral Doxycyline from her PCP.  She does have issues with frequent headaches, more than usual with this recent episode.      Review of Systems   Constitutional: Negative for chills, fatigue, fever and unexpected weight change.   HENT: Positive for congestion, hearing loss, postnasal drip, rhinorrhea, sinus pressure and trouble swallowing. Negative for dental problem, ear discharge, ear pain, facial swelling, nosebleeds, sneezing, sore throat, tinnitus and voice change.    Eyes: Negative for redness, itching and visual disturbance.   Respiratory: Positive for cough. Negative for choking, shortness of breath and wheezing.    Cardiovascular: Negative for chest pain and palpitations.   Gastrointestinal: Negative for abdominal pain.        Hiatal hernia and reflux as above   Musculoskeletal: Negative for gait problem.   Skin: Negative for rash.   Allergic/Immunologic: Positive for environmental allergies.   Neurological: Negative for dizziness, light-headedness and headaches.       Objective:      Physical Exam   Constitutional: She is oriented to person, place, and time. She appears well-developed and well-nourished. No distress.   HENT:   Head: Normocephalic and atraumatic.   Right Ear: Tympanic membrane, external ear and ear  canal normal. Decreased hearing is noted.   Left Ear: Tympanic membrane, external ear and ear canal normal. Decreased hearing is noted.   Nose: Mucosal edema and rhinorrhea present. No nasal deformity or septal deviation. No epistaxis. Right sinus exhibits maxillary sinus tenderness. Right sinus exhibits no frontal sinus tenderness. Left sinus exhibits maxillary sinus tenderness. Left sinus exhibits no frontal sinus tenderness.   Mouth/Throat: Uvula is midline, oropharynx is clear and moist and mucous membranes are normal. Mucous membranes are not pale and not dry. She has dentures (upper and lower). No oropharyngeal exudate or posterior oropharyngeal erythema.   Eyes: Conjunctivae, EOM and lids are normal. Pupils are equal, round, and reactive to light. Right eye exhibits no chemosis. Left eye exhibits no chemosis. Right conjunctiva is not injected. Left conjunctiva is not injected. No scleral icterus. Right eye exhibits normal extraocular motion and no nystagmus. Left eye exhibits normal extraocular motion and no nystagmus.   Neck: Trachea normal and phonation normal. No tracheal tenderness present. No tracheal deviation present. No thyroid mass and no thyromegaly present.   Cardiovascular: Intact distal pulses.    Pulmonary/Chest: Effort normal. No stridor. No respiratory distress.   Abdominal: She exhibits no distension.   Lymphadenopathy:        Head (right side): No submental, no submandibular, no preauricular, no posterior auricular and no occipital adenopathy present.        Head (left side): No submental, no submandibular, no preauricular, no posterior auricular and no occipital adenopathy present.     She has no cervical adenopathy.   Neurological: She is alert and oriented to person, place, and time. No cranial nerve deficit.   Skin: Skin is warm and dry. No rash noted. No erythema.   Psychiatric: She has a normal mood and affect. Her behavior is normal.       Assessment:       1. Acute sinusitis,  recurrence not specified, unspecified location    2. Sensorineural hearing loss (SNHL) of both ears        Plan:       1.  Acute sinusitis:  She again has signs and symptoms of acute sinusitis today.  I sent in a prescription for oral Doxycyxline, as that was most effective for her in the past.  If she is not making progress in 3-4 days, then call and will adjust antibiotics at that time. I would also recommend OTC mucinex, not with the decongestant.  Increase liquids (cardiology said no more than 5 eight ounce glasses daily, but she is not drinking more than 1-2 now).  2.  SNHL:  She has recently lost her hearing aids, call audiology if she is interested in purchasing a new hearing aid.

## 2017-08-07 NOTE — Clinical Note
Ms. Nevarez lost her hearing aids- just wanted to check if she has any coverage for loss?  Her family thinks no, but they wanted to check

## 2017-08-09 NOTE — TELEPHONE ENCOUNTER
----- Message from Jaida Davis sent at 8/9/2017  3:01 PM CDT -----  Contact: Isma/NARGIS Ashby called to speak with the nurse; regarding the Doxycyline they received yesterday. He wants to know if it can be changed because of the price.    [id number] - [place], [place] - [address] AT [facility]  [address]  [place] [address]  Phone: [phone] Fax: [phone]    Thanks,  Jaida

## 2017-08-09 NOTE — TELEPHONE ENCOUNTER
Doxycycline Hyclate is always going to be the cheaper route per Isma.  Patient is waiting on the medicine so please send something in electronically so they don't have to wait until tomorrow.  Thanks.

## 2017-08-16 NOTE — PROGRESS NOTES
8/16/17 - Phone contact made with pt's son, Jean, and advised of referral to and role of OPCM. He will discuss with his wife and if interested in working with CM she will call back. Pretty Martínez RN
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
## 2017-08-21 NOTE — PROGRESS NOTES
Subjective:       Patient ID: Tika Nevarez is a 88 y.o. female.    Chief Complaint: Follow-up and Heel Pain (blisters on both, 1 week now)    Patient presents to clinic today with her son and daughter-in-law for completion of paperwork for nursing home admission. They reports PASSR and PPD are required. They also report blisters were noted by patient's daughter. They report she rarely elevates her legs. They reports she is otherwise doing okay.      Review of Systems   Constitutional: Negative for chills, fatigue, fever and unexpected weight change.   Eyes: Negative for visual disturbance.   Respiratory: Negative for shortness of breath.    Cardiovascular: Positive for leg swelling (chronic, stable). Negative for chest pain.   Musculoskeletal: Negative for myalgias.   Skin:        See HPI     Neurological: Negative for headaches.       Objective:      Physical Exam   Constitutional: She is oriented to person, place, and time. She appears well-developed. No distress.   Frail, elderly, sitting in wheelchair   HENT:   Head: Normocephalic and atraumatic.   Eyes: Conjunctivae and EOM are normal. Pupils are equal, round, and reactive to light. No scleral icterus.   Cardiovascular: An irregularly irregular rhythm present. Exam reveals no gallop and no friction rub.    No murmur heard.  Pulmonary/Chest: Effort normal and breath sounds normal.   Musculoskeletal: She exhibits edema (2+ pitting edema bilateral lower extremities).   Kyphosis   Neurological: She is alert and oriented to person, place, and time. No cranial nerve deficit. Gait normal.   Skin:   Superficial blister noted on dorsal aspect of right heel, no drainage or skin break noted   Psychiatric: She has a normal mood and affect.   Vitals reviewed.      Assessment:       1. Decubitus ulcer of right heel, unstageable    2. Bilateral lower extremity edema    3. Essential hypertension    4. Chronic atrial fibrillation    5. Kyphoscoliosis    6.  Screening-pulmonary TB        Plan:     Problem List Items Addressed This Visit     Chronic atrial fibrillation (Chronic)    Overview     EKG 6/1/2017---Atrial fibrillation with frequent ventricular-paced complexes  ST and Marked T wave abnormality, consider anterolateral ischemia  Prolonged QT  Abnormal ECG         Current Assessment & Plan     Stable, followed by Cardiology; anticoagulants stopped due to recurrent falls         Essential hypertension (Chronic)    Current Assessment & Plan     Controlled, continue current meds         Kyphoscoliosis (Chronic)      Other Visit Diagnoses     Decubitus ulcer of right heel, unstageable    -  Primary    will contact home health to give verbal order for heel boots to get pressure off heels    Bilateral lower extremity edema        advised to elevated legs as much as possible    Screening-pulmonary TB        Relevant Orders    POCT TB Skin Test Read (Completed)        O2 sat difficult to obtain as patient's finger tips are cool. Repeat was around 90%. Patient has clear lungs and is in no distress. Advised to seek medical attention for SOB.

## 2017-08-21 NOTE — TELEPHONE ENCOUNTER
Please contact [facility], give verbal order for heel boots to relieve pressure on heels, she has superficial ulcer on right heel. Thank you

## 2017-08-22 NOTE — PROGRESS NOTES
8/22/17 - Phone contact with pt's daughter, Domitila, today. She advised pt was seen by her PCP yesterday and had a TB skin test placed. She is to return to the clinic tomorrow to have it read. She is also to have a screening visit from [facility] intake staff in the next few days with planned admission there by the end of the week. Advised CM will not admit to OPCM related to impending nursing home admission, but encouraged her to contact me with any needs from [facility] providers while trying to get pt transitioned into nursing home. Pretty Martínez RN

## 2017-08-23 NOTE — PATIENT INSTRUCTIONS
PPD negative  Off load heels with either pillows or pressure relieving boots. Can paint right heel with betadine once daily.  Keep legs elevated when sitting or laying danielito to alleviate swelling    Check [facility] Thursday evening for Dr. Benitez's note to print off

## 2017-08-23 NOTE — PROGRESS NOTES
Subjective:       Patient ID: Tika Nevarez is a 88 y.o. female.    Chief Complaint: Follow-up (recheck heel ulcer) and Other (TB reading)    Patient presents with her son, who provides the history, for follow up of PPD read and right heel ulcer. Patient is staying with daughter, who is not present, and it is unclear if heels are being off-loaded per previous recommendation.       Review of Systems   Constitutional: Negative for chills and fever.   Respiratory: Negative for shortness of breath.    Cardiovascular: Positive for leg swelling (bilateral). Negative for palpitations.   Gastrointestinal: Positive for nausea (occasional, emetrol alleviates). Negative for diarrhea and vomiting.   Musculoskeletal: Positive for gait problem (in wheelchair).   Skin: Positive for wound (right heel ulcer).       Objective:      Physical Exam   Constitutional: She appears well-developed and well-nourished. No distress.   Frail    Cardiovascular: Normal rate and regular rhythm.  Exam reveals no friction rub.    No murmur heard.  Pulses:       Dorsalis pedis pulses are 1+ on the right side, and 1+ on the left side.        Posterior tibial pulses are 1+ on the right side, and 1+ on the left side.   Musculoskeletal:        Feet:    Feet:   Right Foot:   Skin Integrity: Positive for ulcer and dry skin.   Left Foot:   Skin Integrity: Positive for blister and dry skin.   Skin: She is not diaphoretic.   Vitals reviewed.      Assessment:       1. Decubitus ulcer of right heel, unstageable        Plan:   Decubitus ulcer of right heel, unstageable      Heel off-loading  May paint right heel with betadine daily  Patient is seeking placement at [facility] and has meeting in two days with facility. Son hopes she will be granted placement next week.   Given zofran allergy, patient can continue emetrol PRN.   Return if symptoms worsen or fail to improve.

## 2017-12-06 NOTE — LETTER
December 6, 2017      Torito Yost MD  [address] [place] [address]           [facility] - Rheumatology  [address]  [place] [address]  Phone: [phone]  Fax: [phone]          Patient: Tika Nevarez   MR Number: [id number]   YOB: 1929   Date of Visit: 12/6/2017       Dear Dr. Torito Yost:    Thank you for referring Tika Nevarez to me for evaluation. Attached you will find relevant portions of my assessment and plan of care.    If you have questions, please do not hesitate to call me. I look forward to following Tika Nevarez along with you.    Sincerely,    Conchita Mccullough PA-C    Enclosure  CC:  No Recipients    If you would like to receive this communication electronically, please contact [email or web address] or [phone] to request more information on [employer] Link access.    For providers and/or their staff who would like to refer a patient to [facility], please contact us through our one-stop-shop provider referral line, [facility], at [phone].    If you feel you have received this communication in error or would no longer like to receive these types of communications, please e-mail [email or web address]

## 2017-12-06 NOTE — PROGRESS NOTES
Subjective:       Patient ID: Tika Nevarez is a 88 y.o. female.    Chief Complaint: Osteoporosis and Pain    Here for follow-up on OP due for prolia  Last done 5/24/17. On vitamin D 1000 IU daily and daily Multivitamin. Doesn't really eat dairy because of GI side effects.  She is now at [facility] (nursing home) she had 4-5 falls may-July when at home. Sustained left clavicle fx and small subarachnoid hemorrhage. Was admitted and watched. She recovered without surgery. She was placed in nursing home in September and no subsequent falls since then.      Diagnosed with osteoporosis ~20 yrs ago. Previously on Fosamax (developed abdominal pain). Then treated with Reclast x 5 yrs (no improvement). Switched to Prolia in '11 until '13 when it was stopped for a dental procedure. Was lost to f/u until 2/15 when she saw Dr. Iqbal. Resumed Prolia  3/2/15 - tolerated well. Was found to have a new T11 compression fracture in 3/15.  H/o ankle fracture ('80). +Vertebral fracture (T11; 3/15). +Family history of osteoporosis (parents and sisters). Hysterectomy in her late 20's (irregular bleeding); unsure when she went through menopause. No tobacco. Occasional alcohol (none recently). +Decrease height (1 inch over 3 yrs).    She is in wheelchair. Her repeat  DEXA 5/24/17- stable but still osteoporotic     Significant OA hands especially DIP, PIP and CMC joints, and spine.  She does have kyphosis of the mid back.      Review of Systems   Constitutional: Negative.  Negative for activity change, appetite change, chills, fatigue and fever.   HENT: Negative.  Negative for mouth sores and trouble swallowing.         No dry mouth   Eyes: Negative.  Negative for photophobia, pain and redness.        No swollen or red eyes, no dry eye     Respiratory: Negative.  Negative for chest tightness, shortness of breath, wheezing and stridor.    Cardiovascular: Negative.  Negative for chest pain.   Gastrointestinal: Negative.  Negative  for abdominal pain, blood in stool, diarrhea, nausea and vomiting.   Genitourinary: Negative.  Negative for dysuria, frequency, hematuria and urgency.   Musculoskeletal: Positive for arthralgias, back pain and gait problem. Negative for joint swelling, myalgias, neck pain and neck stiffness.        Stable with the aid of a walker   Skin: Negative.  Negative for color change, pallor and rash.   Neurological: Negative for weakness.   Hematological: Negative for adenopathy.   Psychiatric/Behavioral: Negative for suicidal ideas.         Objective:   /64   Pulse 68   Ht 5' (1.524 m)   Wt 44.1 kg (97 lb 3.6 oz)   LMP 05/15/1960   BMI 18.99 kg/m²      Physical Exam   Constitutional: She is oriented to person, place, and time and well-developed, well-nourished, and in no distress. No distress.   HENT:   Head: Normocephalic and atraumatic.   Right Ear: External ear normal.   Left Ear: External ear normal.   Mouth/Throat: No oropharyngeal exudate.   Eyes: Conjunctivae and EOM are normal. Pupils are equal, round, and reactive to light. No scleral icterus.   Neck: Normal range of motion. Neck supple. No thyromegaly present.   Cardiovascular: Normal rate, regular rhythm and normal heart sounds.    No murmur heard.  Pulmonary/Chest: Effort normal and breath sounds normal. She exhibits no tenderness.   Abdominal: Soft. Bowel sounds are normal.   Lymphadenopathy:     She has no cervical adenopathy.   Neurological: She is alert and oriented to person, place, and time. She displays normal reflexes. No cranial nerve deficit. She exhibits normal muscle tone. Gait normal.   Skin: Skin is warm and dry. No rash noted.     Musculoskeletal: She exhibits deformity. She exhibits no edema or tenderness.   Mod kyphosis mid back   Not TTP along spine   Limited cervical range of motion   OA changes in her hand aashish ryanne cmc, pip and dip joints                No results found for this or any previous visit (from the past 168  hour(s)).            DEXA 5/24/17 total femur T score -1.5, femur neck -1.5, spine -2.5 impression improved bone density at the spine, stable at the hip       Assessment:       1. Age-related osteoporosis without current pathological fracture    2. Primary osteoarthritis involving multiple joints    3. Medication monitoring encounter    4. Low serum vitamin D          1.Osteoporosis with improved bone density now on Prolia ×2 years after 2 year hiatus lost to follow-up      hx of compression fracture-   Vit D at goal 3/2016.  On 1000 IU D3 daily- not on calcium supplements.     Diagnosed with osteoporosis ~20 yrs ago. Previously on Fosamax (developed abdominal pain). Then treated with Reclast x 5 yrs (no improvement). Switched to Prolia in '11 until '13 when it was stopped for a dental procedure. Was lost to f/u 2013 until 2/15 when she saw Dr. Iqbal. Received Prolia again 3/2/15,  9/3/15, 3/28/2016, 11/20/16    Last dexa 12/2014 with decline in BMD so prolia was started.  Repeat bone density 5/24/17 stable at the hip 9% improvement at the spine- will continue Prolia    2.  Generalized osteoarthritis with predominant hand and spine involvement    3.  Medication monitoring --no chronic kidney disease, no hypocalcemia, no pros Prolia issues    Plan:         Ok for Prolia today Ca normal 9.6, gfr >60  continue Prolia 60 mg every 6 months for the next 2 years and repeat her bone density at that time if she is now osteopenic will consider placing her on some type of a cyclic bisphosphonate therapy 2 to maintain bone density      no need for post calcium check she has normal renal function, calcium level normal  Continue her vitamin D 1000 international units in her dietary intake of calcium    She can use Tylenol for her joint issues        Return to our clinic in 6 months with a complete metabolic panel and Prolia injection    Call with any questions, changes, or concerns.

## 2017-12-06 NOTE — PROGRESS NOTES
Administered 1cc Prolia 60mg/cc to RLQ of abdomen. Pt. Tolerated well. No acute reaction noted to site. Pt. Instructed on S/S of reaction to report. Pt. Verbalized understanding. Pt instructed to wait 15 minutes for adverse side effects noted.    Lot:[id number]  Exp:12/19  Manu:[email or web address]

## 2018-01-01 ENCOUNTER — HOSPITAL ENCOUNTER (INPATIENT)
Facility: HOSPITAL | Age: 83
LOS: 2 days | DRG: 377 | End: 2018-01-24
Attending: EMERGENCY MEDICINE | Admitting: INTERNAL MEDICINE
Payer: MEDICARE

## 2018-01-01 ENCOUNTER — HOSPITAL ENCOUNTER (EMERGENCY)
Facility: HOSPITAL | Age: 83
Discharge: NURSING, REHAB OR LONG-TERM CARE | End: 2018-01-18
Attending: EMERGENCY MEDICINE
Payer: MEDICARE

## 2018-01-01 VITALS
BODY MASS INDEX: 22.83 KG/M2 | RESPIRATION RATE: 20 BRPM | HEART RATE: 75 BPM | WEIGHT: 113.25 LBS | TEMPERATURE: 100 F | HEIGHT: 59 IN | SYSTOLIC BLOOD PRESSURE: 147 MMHG | DIASTOLIC BLOOD PRESSURE: 70 MMHG | OXYGEN SATURATION: 100 %

## 2018-01-01 DIAGNOSIS — D62 ACUTE BLOOD LOSS ANEMIA: ICD-10-CM

## 2018-01-01 DIAGNOSIS — R53.1 WEAKNESS: ICD-10-CM

## 2018-01-01 DIAGNOSIS — K92.0 COFFEE GROUND VOMITING: Primary | ICD-10-CM

## 2018-01-01 DIAGNOSIS — I48.91 ATRIAL FIBRILLATION WITH RVR: ICD-10-CM

## 2018-01-01 DIAGNOSIS — R11.10 VOMITING: ICD-10-CM

## 2018-01-01 DIAGNOSIS — S00.83XA FACIAL CONTUSION, INITIAL ENCOUNTER: ICD-10-CM

## 2018-01-01 DIAGNOSIS — N30.01 ACUTE CYSTITIS WITH HEMATURIA: ICD-10-CM

## 2018-01-01 DIAGNOSIS — S02.2XXA CLOSED FRACTURE OF NASAL BONE, INITIAL ENCOUNTER: Primary | ICD-10-CM

## 2018-01-01 DIAGNOSIS — K92.0 HEMATEMESIS WITHOUT NAUSEA: ICD-10-CM

## 2018-01-01 LAB
ABO + RH BLD: NORMAL
ALBUMIN SERPL BCP-MCNC: 3.1 G/DL
ALBUMIN SERPL BCP-MCNC: 3.3 G/DL
ALP SERPL-CCNC: 38 U/L
ALP SERPL-CCNC: 41 U/L
ALT SERPL W/O P-5'-P-CCNC: 8 U/L
ALT SERPL W/O P-5'-P-CCNC: 9 U/L
ANION GAP SERPL CALC-SCNC: 10 MMOL/L
ANION GAP SERPL CALC-SCNC: 11 MMOL/L
ANION GAP SERPL CALC-SCNC: 13 MMOL/L
ANISOCYTOSIS BLD QL SMEAR: SLIGHT
APTT BLDCRRT: 25.2 SEC
APTT BLDCRRT: 28.8 SEC
AST SERPL-CCNC: 17 U/L
AST SERPL-CCNC: 17 U/L
BACTERIA #/AREA URNS HPF: ABNORMAL /HPF
BASOPHILS # BLD AUTO: 0.01 K/UL
BASOPHILS # BLD AUTO: 0.01 K/UL
BASOPHILS # BLD AUTO: 0.02 K/UL
BASOPHILS NFR BLD: 0.1 %
BASOPHILS NFR BLD: 0.1 %
BASOPHILS NFR BLD: 0.2 %
BILIRUB SERPL-MCNC: 0.6 MG/DL
BILIRUB SERPL-MCNC: 1.2 MG/DL
BILIRUB UR QL STRIP: NEGATIVE
BILIRUB UR QL STRIP: NEGATIVE
BLD GP AB SCN CELLS X3 SERPL QL: NORMAL
BUN SERPL-MCNC: 18 MG/DL
BUN SERPL-MCNC: 19 MG/DL
BUN SERPL-MCNC: 21 MG/DL
CALCIUM SERPL-MCNC: 8.9 MG/DL
CALCIUM SERPL-MCNC: 9.1 MG/DL
CALCIUM SERPL-MCNC: 9.3 MG/DL
CHLORIDE SERPL-SCNC: 103 MMOL/L
CHLORIDE SERPL-SCNC: 103 MMOL/L
CHLORIDE SERPL-SCNC: 104 MMOL/L
CLARITY UR: ABNORMAL
CLARITY UR: ABNORMAL
CO2 SERPL-SCNC: 27 MMOL/L
CO2 SERPL-SCNC: 28 MMOL/L
CO2 SERPL-SCNC: 30 MMOL/L
COLOR UR: YELLOW
COLOR UR: YELLOW
CREAT SERPL-MCNC: 0.7 MG/DL
DACRYOCYTES BLD QL SMEAR: ABNORMAL
DIFFERENTIAL METHOD: ABNORMAL
EOSINOPHIL # BLD AUTO: 0 K/UL
EOSINOPHIL # BLD AUTO: 0.1 K/UL
EOSINOPHIL NFR BLD: 0 %
EOSINOPHIL NFR BLD: 0.1 %
EOSINOPHIL NFR BLD: 1.3 %
ERYTHROCYTE [DISTWIDTH] IN BLOOD BY AUTOMATED COUNT: 18.5 %
ERYTHROCYTE [DISTWIDTH] IN BLOOD BY AUTOMATED COUNT: 18.7 %
ERYTHROCYTE [DISTWIDTH] IN BLOOD BY AUTOMATED COUNT: 18.7 %
ERYTHROCYTE [DISTWIDTH] IN BLOOD BY AUTOMATED COUNT: 18.8 %
ERYTHROCYTE [DISTWIDTH] IN BLOOD BY AUTOMATED COUNT: 19.2 %
EST. GFR  (AFRICAN AMERICAN): >60 ML/MIN/1.73 M^2
EST. GFR  (NON AFRICAN AMERICAN): >60 ML/MIN/1.73 M^2
FLUAV AG SPEC QL IA: NEGATIVE
FLUBV AG SPEC QL IA: NEGATIVE
GLUCOSE SERPL-MCNC: 119 MG/DL
GLUCOSE SERPL-MCNC: 124 MG/DL
GLUCOSE SERPL-MCNC: 95 MG/DL
GLUCOSE UR QL STRIP: NEGATIVE
GLUCOSE UR QL STRIP: NEGATIVE
HCT VFR BLD AUTO: 28 %
HCT VFR BLD AUTO: 28 %
HCT VFR BLD AUTO: 28.3 %
HCT VFR BLD AUTO: 28.3 %
HCT VFR BLD AUTO: 28.7 %
HCT VFR BLD AUTO: 29.5 %
HCT VFR BLD AUTO: 29.6 %
HCT VFR BLD AUTO: 29.8 %
HCT VFR BLD AUTO: 30 %
HCT VFR BLD AUTO: 30.1 %
HGB BLD-MCNC: 8.9 G/DL
HGB BLD-MCNC: 8.9 G/DL
HGB BLD-MCNC: 9.2 G/DL
HGB BLD-MCNC: 9.3 G/DL
HGB BLD-MCNC: 9.5 G/DL
HGB BLD-MCNC: 9.5 G/DL
HGB UR QL STRIP: ABNORMAL
HGB UR QL STRIP: NEGATIVE
HYPOCHROMIA BLD QL SMEAR: ABNORMAL
INR PPP: 1.2
INR PPP: 1.3
KETONES UR QL STRIP: ABNORMAL
KETONES UR QL STRIP: NEGATIVE
LEUKOCYTE ESTERASE UR QL STRIP: ABNORMAL
LEUKOCYTE ESTERASE UR QL STRIP: NEGATIVE
LYMPHOCYTES # BLD AUTO: 0.7 K/UL
LYMPHOCYTES # BLD AUTO: 0.8 K/UL
LYMPHOCYTES # BLD AUTO: 0.9 K/UL
LYMPHOCYTES # BLD AUTO: 1 K/UL
LYMPHOCYTES # BLD AUTO: 1.6 K/UL
LYMPHOCYTES NFR BLD: 18.7 %
LYMPHOCYTES NFR BLD: 5.8 %
LYMPHOCYTES NFR BLD: 6.1 %
LYMPHOCYTES NFR BLD: 6.2 %
LYMPHOCYTES NFR BLD: 6.7 %
MAGNESIUM SERPL-MCNC: 1.9 MG/DL
MCH RBC QN AUTO: 33.3 PG
MCH RBC QN AUTO: 33.5 PG
MCH RBC QN AUTO: 33.6 PG
MCH RBC QN AUTO: 34.5 PG
MCH RBC QN AUTO: 34.7 PG
MCHC RBC AUTO-ENTMCNC: 31.4 G/DL
MCHC RBC AUTO-ENTMCNC: 31.9 G/DL
MCHC RBC AUTO-ENTMCNC: 32.1 G/DL
MCHC RBC AUTO-ENTMCNC: 32.1 G/DL
MCHC RBC AUTO-ENTMCNC: 32.9 G/DL
MCV RBC AUTO: 104 FL
MCV RBC AUTO: 104 FL
MCV RBC AUTO: 105 FL
MCV RBC AUTO: 106 FL
MCV RBC AUTO: 110 FL
MICROSCOPIC COMMENT: ABNORMAL
MONOCYTES # BLD AUTO: 0.6 K/UL
MONOCYTES # BLD AUTO: 0.6 K/UL
MONOCYTES # BLD AUTO: 0.8 K/UL
MONOCYTES NFR BLD: 4.3 %
MONOCYTES NFR BLD: 4.8 %
MONOCYTES NFR BLD: 4.9 %
MONOCYTES NFR BLD: 5.9 %
MONOCYTES NFR BLD: 9.5 %
NEUTROPHILS # BLD AUTO: 11.2 K/UL
NEUTROPHILS # BLD AUTO: 11.6 K/UL
NEUTROPHILS # BLD AUTO: 11.6 K/UL
NEUTROPHILS # BLD AUTO: 14 K/UL
NEUTROPHILS # BLD AUTO: 6 K/UL
NEUTROPHILS NFR BLD: 70.3 %
NEUTROPHILS NFR BLD: 88.1 %
NEUTROPHILS NFR BLD: 88.8 %
NEUTROPHILS NFR BLD: 88.9 %
NEUTROPHILS NFR BLD: 89 %
NITRITE UR QL STRIP: NEGATIVE
NITRITE UR QL STRIP: POSITIVE
OB PNL GAST: POSITIVE
OVALOCYTES BLD QL SMEAR: ABNORMAL
PH UR STRIP: 7 [PH] (ref 5–8)
PH UR STRIP: 7 [PH] (ref 5–8)
PLATELET # BLD AUTO: 236 K/UL
PLATELET # BLD AUTO: 250 K/UL
PLATELET # BLD AUTO: 264 K/UL
PLATELET # BLD AUTO: 271 K/UL
PLATELET # BLD AUTO: 277 K/UL
PLATELET BLD QL SMEAR: ABNORMAL
PMV BLD AUTO: 8.6 FL
PMV BLD AUTO: 8.7 FL
PMV BLD AUTO: 9 FL
PMV BLD AUTO: 9.1 FL
PMV BLD AUTO: 9.4 FL
POIKILOCYTOSIS BLD QL SMEAR: SLIGHT
POTASSIUM SERPL-SCNC: 3.6 MMOL/L
POTASSIUM SERPL-SCNC: 3.9 MMOL/L
POTASSIUM SERPL-SCNC: 4.3 MMOL/L
PROT SERPL-MCNC: 7.8 G/DL
PROT SERPL-MCNC: 8 G/DL
PROT UR QL STRIP: ABNORMAL
PROT UR QL STRIP: ABNORMAL
PROTHROMBIN TIME: 12.1 SEC
PROTHROMBIN TIME: 13.7 SEC
RBC # BLD AUTO: 2.58 M/UL
RBC # BLD AUTO: 2.65 M/UL
RBC # BLD AUTO: 2.75 M/UL
RBC # BLD AUTO: 2.83 M/UL
RBC # BLD AUTO: 2.85 M/UL
RBC #/AREA URNS HPF: 0 /HPF (ref 0–4)
SODIUM SERPL-SCNC: 142 MMOL/L
SODIUM SERPL-SCNC: 143 MMOL/L
SODIUM SERPL-SCNC: 144 MMOL/L
SP GR UR STRIP: 1.02 (ref 1–1.03)
SP GR UR STRIP: 1.02 (ref 1–1.03)
SPECIMEN SOURCE: NORMAL
STOMATOCYTES BLD QL SMEAR: PRESENT
TSH SERPL DL<=0.005 MIU/L-ACNC: 0.88 UIU/ML
URN SPEC COLLECT METH UR: ABNORMAL
URN SPEC COLLECT METH UR: ABNORMAL
UROBILINOGEN UR STRIP-ACNC: 1 EU/DL
UROBILINOGEN UR STRIP-ACNC: 1 EU/DL
WBC # BLD AUTO: 12.54 K/UL
WBC # BLD AUTO: 13.02 K/UL
WBC # BLD AUTO: 13.2 K/UL
WBC # BLD AUTO: 15.79 K/UL
WBC # BLD AUTO: 8.6 K/UL
WBC #/AREA URNS HPF: 20 /HPF (ref 0–5)
WBC CLUMPS URNS QL MICRO: ABNORMAL

## 2018-01-01 PROCEDURE — C9113 INJ PANTOPRAZOLE SODIUM, VIA: HCPCS | Performed by: EMERGENCY MEDICINE

## 2018-01-01 PROCEDURE — 25000003 PHARM REV CODE 250: Performed by: NURSE PRACTITIONER

## 2018-01-01 PROCEDURE — 80053 COMPREHEN METABOLIC PANEL: CPT

## 2018-01-01 PROCEDURE — 63600175 PHARM REV CODE 636 W HCPCS: Performed by: NURSE PRACTITIONER

## 2018-01-01 PROCEDURE — 85014 HEMATOCRIT: CPT

## 2018-01-01 PROCEDURE — 99285 EMERGENCY DEPT VISIT HI MDM: CPT

## 2018-01-01 PROCEDURE — 83735 ASSAY OF MAGNESIUM: CPT

## 2018-01-01 PROCEDURE — 85730 THROMBOPLASTIN TIME PARTIAL: CPT

## 2018-01-01 PROCEDURE — 81003 URINALYSIS AUTO W/O SCOPE: CPT

## 2018-01-01 PROCEDURE — 99232 SBSQ HOSP IP/OBS MODERATE 35: CPT | Mod: ,,, | Performed by: INTERNAL MEDICINE

## 2018-01-01 PROCEDURE — 96365 THER/PROPH/DIAG IV INF INIT: CPT

## 2018-01-01 PROCEDURE — 86901 BLOOD TYPING SEROLOGIC RH(D): CPT

## 2018-01-01 PROCEDURE — 85610 PROTHROMBIN TIME: CPT

## 2018-01-01 PROCEDURE — 21400001 HC TELEMETRY ROOM

## 2018-01-01 PROCEDURE — C9113 INJ PANTOPRAZOLE SODIUM, VIA: HCPCS | Performed by: NURSE PRACTITIONER

## 2018-01-01 PROCEDURE — 36415 COLL VENOUS BLD VENIPUNCTURE: CPT

## 2018-01-01 PROCEDURE — 82271 OCCULT BLOOD OTHER SOURCES: CPT

## 2018-01-01 PROCEDURE — 25000003 PHARM REV CODE 250: Performed by: EMERGENCY MEDICINE

## 2018-01-01 PROCEDURE — 96367 TX/PROPH/DG ADDL SEQ IV INF: CPT

## 2018-01-01 PROCEDURE — 85018 HEMOGLOBIN: CPT | Mod: 91

## 2018-01-01 PROCEDURE — 99285 EMERGENCY DEPT VISIT HI MDM: CPT | Mod: 25

## 2018-01-01 PROCEDURE — 85025 COMPLETE CBC W/AUTO DIFF WBC: CPT

## 2018-01-01 PROCEDURE — 85018 HEMOGLOBIN: CPT

## 2018-01-01 PROCEDURE — 87400 INFLUENZA A/B EACH AG IA: CPT | Mod: 59

## 2018-01-01 PROCEDURE — 96375 TX/PRO/DX INJ NEW DRUG ADDON: CPT | Mod: 59

## 2018-01-01 PROCEDURE — P9612 CATHETERIZE FOR URINE SPEC: HCPCS

## 2018-01-01 PROCEDURE — 81000 URINALYSIS NONAUTO W/SCOPE: CPT

## 2018-01-01 PROCEDURE — 21310 HC CL TX OF NASAL FX WO MAN: CPT

## 2018-01-01 PROCEDURE — 63600175 PHARM REV CODE 636 W HCPCS: Performed by: EMERGENCY MEDICINE

## 2018-01-01 PROCEDURE — 11000001 HC ACUTE MED/SURG PRIVATE ROOM

## 2018-01-01 PROCEDURE — 97802 MEDICAL NUTRITION INDIV IN: CPT

## 2018-01-01 PROCEDURE — 93010 ELECTROCARDIOGRAM REPORT: CPT | Mod: ,,, | Performed by: INTERNAL MEDICINE

## 2018-01-01 PROCEDURE — 85025 COMPLETE CBC W/AUTO DIFF WBC: CPT | Mod: 91

## 2018-01-01 PROCEDURE — 84443 ASSAY THYROID STIM HORMONE: CPT

## 2018-01-01 PROCEDURE — 96366 THER/PROPH/DIAG IV INF ADDON: CPT | Mod: 59

## 2018-01-01 PROCEDURE — 96374 THER/PROPH/DIAG INJ IV PUSH: CPT | Mod: 59

## 2018-01-01 PROCEDURE — 93005 ELECTROCARDIOGRAM TRACING: CPT

## 2018-01-01 PROCEDURE — 85014 HEMATOCRIT: CPT | Mod: 91

## 2018-01-01 PROCEDURE — 25000003 PHARM REV CODE 250

## 2018-01-01 PROCEDURE — 99232 SBSQ HOSP IP/OBS MODERATE 35: CPT | Mod: ,,, | Performed by: PHYSICIAN ASSISTANT

## 2018-01-01 PROCEDURE — 80048 BASIC METABOLIC PNL TOTAL CA: CPT

## 2018-01-01 RX ORDER — SODIUM CHLORIDE 0.9 % (FLUSH) 0.9 %
3 SYRINGE (ML) INJECTION
Status: DISCONTINUED | OUTPATIENT
Start: 2018-01-01 | End: 2018-01-25 | Stop reason: HOSPADM

## 2018-01-01 RX ORDER — LEVOTHYROXINE SODIUM 75 UG/1
75 TABLET ORAL
Status: DISCONTINUED | OUTPATIENT
Start: 2018-01-01 | End: 2018-01-25 | Stop reason: HOSPADM

## 2018-01-01 RX ORDER — DILTIAZEM HCL/D5W 125 MG/125
10 PLASTIC BAG, INJECTION (ML) INTRAVENOUS CONTINUOUS
Status: DISCONTINUED | OUTPATIENT
Start: 2018-01-01 | End: 2018-01-25 | Stop reason: HOSPADM

## 2018-01-01 RX ORDER — DILTIAZEM HYDROCHLORIDE 5 MG/ML
20 INJECTION INTRAVENOUS
Status: COMPLETED | OUTPATIENT
Start: 2018-01-01 | End: 2018-01-01

## 2018-01-01 RX ORDER — MONTELUKAST SODIUM 10 MG/1
10 TABLET ORAL DAILY
Status: DISCONTINUED | OUTPATIENT
Start: 2018-01-01 | End: 2018-01-25 | Stop reason: HOSPADM

## 2018-01-01 RX ORDER — PANTOPRAZOLE SODIUM 40 MG/10ML
40 INJECTION, POWDER, LYOPHILIZED, FOR SOLUTION INTRAVENOUS 2 TIMES DAILY
Status: DISCONTINUED | OUTPATIENT
Start: 2018-01-01 | End: 2018-01-25 | Stop reason: HOSPADM

## 2018-01-01 RX ORDER — CIPROFLOXACIN 2 MG/ML
400 INJECTION, SOLUTION INTRAVENOUS
Status: DISCONTINUED | OUTPATIENT
Start: 2018-01-01 | End: 2018-01-25 | Stop reason: HOSPADM

## 2018-01-01 RX ORDER — PANTOPRAZOLE SODIUM 40 MG/10ML
40 INJECTION, POWDER, LYOPHILIZED, FOR SOLUTION INTRAVENOUS DAILY
Status: DISCONTINUED | OUTPATIENT
Start: 2018-01-01 | End: 2018-01-01

## 2018-01-01 RX ORDER — OSELTAMIVIR PHOSPHATE 75 MG/1
75 CAPSULE ORAL
COMMUNITY

## 2018-01-01 RX ORDER — DULOXETIN HYDROCHLORIDE 30 MG/1
30 CAPSULE, DELAYED RELEASE ORAL 2 TIMES DAILY
Status: DISCONTINUED | OUTPATIENT
Start: 2018-01-01 | End: 2018-01-25 | Stop reason: HOSPADM

## 2018-01-01 RX ORDER — CIPROFLOXACIN 250 MG/1
250 TABLET, FILM COATED ORAL 2 TIMES DAILY
Qty: 14 TABLET | Refills: 0 | Status: SHIPPED | OUTPATIENT
Start: 2018-01-01 | End: 2018-01-25

## 2018-01-01 RX ORDER — PANTOPRAZOLE SODIUM 40 MG/10ML
40 INJECTION, POWDER, LYOPHILIZED, FOR SOLUTION INTRAVENOUS
Status: COMPLETED | OUTPATIENT
Start: 2018-01-01 | End: 2018-01-01

## 2018-01-01 RX ORDER — METOPROLOL TARTRATE 50 MG/1
100 TABLET ORAL 2 TIMES DAILY
Status: DISCONTINUED | OUTPATIENT
Start: 2018-01-01 | End: 2018-01-25 | Stop reason: HOSPADM

## 2018-01-01 RX ORDER — DILTIAZEM HCL/D5W 125 MG/125
10 PLASTIC BAG, INJECTION (ML) INTRAVENOUS CONTINUOUS
Status: DISCONTINUED | OUTPATIENT
Start: 2018-01-01 | End: 2018-01-01

## 2018-01-01 RX ORDER — CIPROFLOXACIN 250 MG/1
250 TABLET, FILM COATED ORAL
Status: COMPLETED | OUTPATIENT
Start: 2018-01-01 | End: 2018-01-01

## 2018-01-01 RX ADMIN — DILTIAZEM HYDROCHLORIDE 20 MG: 5 INJECTION INTRAVENOUS at 11:01

## 2018-01-01 RX ADMIN — Medication 10 MG/HR: at 12:01

## 2018-01-01 RX ADMIN — OCTREOTIDE ACETATE 50 MCG/HR: 500 INJECTION, SOLUTION INTRAVENOUS; SUBCUTANEOUS at 05:01

## 2018-01-01 RX ADMIN — MONTELUKAST SODIUM 10 MG: 10 TABLET, COATED ORAL at 09:01

## 2018-01-01 RX ADMIN — DULOXETINE 30 MG: 30 CAPSULE, DELAYED RELEASE ORAL at 09:01

## 2018-01-01 RX ADMIN — Medication 10 MG/HR: at 05:01

## 2018-01-01 RX ADMIN — CIPROFLOXACIN 400 MG: 2 INJECTION, SOLUTION INTRAVENOUS at 09:01

## 2018-01-01 RX ADMIN — PANTOPRAZOLE SODIUM 40 MG: 40 INJECTION, POWDER, FOR SOLUTION INTRAVENOUS at 09:01

## 2018-01-01 RX ADMIN — METOPROLOL TARTRATE 100 MG: 50 TABLET ORAL at 09:01

## 2018-01-01 RX ADMIN — DILTIAZEM HYDROCHLORIDE 20 MG: 5 INJECTION INTRAVENOUS at 03:01

## 2018-01-01 RX ADMIN — CIPROFLOXACIN HYDROCHLORIDE 250 MG: 250 TABLET, FILM COATED ORAL at 08:01

## 2018-01-01 RX ADMIN — PANTOPRAZOLE SODIUM 40 MG: 40 INJECTION, POWDER, FOR SOLUTION INTRAVENOUS at 01:01

## 2018-01-01 RX ADMIN — LORAZEPAM 0.5 MG: 2 INJECTION, SOLUTION INTRAMUSCULAR; INTRAVENOUS at 06:01

## 2018-01-01 RX ADMIN — OCTREOTIDE ACETATE 50 MCG/HR: 500 INJECTION, SOLUTION INTRAVENOUS; SUBCUTANEOUS at 04:01

## 2018-01-01 RX ADMIN — PROMETHAZINE HYDROCHLORIDE 6.25 MG: 25 INJECTION INTRAMUSCULAR; INTRAVENOUS at 12:01

## 2018-01-01 RX ADMIN — OCTREOTIDE ACETATE 50 MCG/HR: 500 INJECTION, SOLUTION INTRAVENOUS; SUBCUTANEOUS at 02:01

## 2018-01-01 RX ADMIN — Medication 10 MG/HR: at 03:01

## 2018-01-18 NOTE — ED PROVIDER NOTES
SCRIBE #1 NOTE: IShweta, am scribing for, and in the presence of, Uriel Herrera MD. I have scribed the entire note.      History      Chief Complaint   Patient presents with    Fall     unwitnessed fall, found at 1130 at NH       Review of patient's allergies indicates:   Allergen Reactions    Alendronate      abdominal pains    Amoxicillin-pot clavulanate Nausea And Vomiting    Codeine     Morphine      Difficulty breathing    Olmesartan      Nausea    Propoxyphene n-acetaminophen      Other reaction(s): Unable to obtain    Sulfa (sulfonamide antibiotics)      Stomach upset, Nausea    Zofran [ondansetron hcl (pf)]         HPI   HPI    1/18/2018, 4:36 PM   History obtained from the EMS  HPI limited secondary to patient's mental status      History of Present Illness: Tika Nevarez is a 88 y.o. female patient from [facility] who presents to ED for evaluation after having an unwitnessed fall this morning at the nursing home. Patient was found faced down on the floor and has not been acting normal since. Unsure of patient's baseline mental status at this time. Patient has bruising below the L eye. Symptoms are constant and moderate in severity. No further complaints or concerns at this time.       Arrival mode: Ambulance    PCP: Jackie Lomeli MD       Past Medical History:  Past Medical History:   Diagnosis Date    Anemia     Anxiety     Arthritis     Atrial fibrillation     Cervicalgia     followed by PM&[facility] at [facility]    CHF (congestive heart failure)     Colitis     Compression fracture     Coronary artery disease     Depression     Frequent UTI     Hiatal hernia 7/12/2013    Hypertension     Hypothyroidism     Inguinal hernia     CT Abdomen/pelvis 10/29/2016---Left inguinal hernia containing loops of small bowel.    Lumbago     followed by PM&R at [facility]    Moderate aortic regurgitation 12/8/2014    Stable and treated with  medications and followed by Cardiology     Non-rheumatic mitral regurgitation moderate-severe 9/16/2013    Osteoporosis 12/8/2014    Pacemaker     Peripheral vascular disease     Pneumonia     Pneumonia due to other specified bacteria(482.89)     Spinal cord disease     Suicide and self-inflicted injury by other specified means 1986 approx    took 's antianxiety  more of unintentinal OD. per patient    Thoracic or lumbosacral neuritis or radiculitis, unspecified     [facility]    Trouble in sleeping     Urinary incontinence        Past Surgical History:  Past Surgical History:   Procedure Laterality Date    APPENDECTOMY  1958    BACK SURGERY      BREAST SURGERY Right 1959 approx    Bx - benign    EYE SURGERY Bilateral 2014    cataract w/ IOL Lizette Ghosh eye MD    HYSTERECTOMY  1958    IZABELA for fibroids , risk for cancer and prolapse    JOINT REPLACEMENT Bilateral 2000 approx    knee    pacemaker with defibrillator Left     SPINE SURGERY      lumbar disc x 2         Family History:  Family History   Problem Relation Age of Onset    Heart disease Mother     Dementia Father     Heart disease Brother     Dementia Brother     Stroke Sister     Mental illness Paternal Aunt     Hypertension Sister     Heart disease Sister     Cancer Neg Hx     Mental retardation Neg Hx     Diabetes Neg Hx     COPD Neg Hx     Asthma Neg Hx     Alcohol abuse Neg Hx     Drug abuse Neg Hx        Social History:  Social History     Social History Main Topics    Smoking status: Never Smoker    Smokeless tobacco: Never Used    Alcohol use No    Drug use: No    Sexual activity: No       ROS   Review of Systems   Unable to perform ROS: Mental status change       Physical Exam      Initial Vitals [01/18/18 1629]   BP Pulse Resp Temp SpO2   (!) 110/52 72 20 98.4 °F (36.9 °C) 96 %      MAP       71.33          Physical Exam  Nursing Notes and Vital Signs  "Reviewed.  Constitutional: Patient is in no acute distress. Awake and alert. Well-developed and well-nourished.  Head: Normocephalic. Bruising and TTP below the L eye.  Eyes: PERRL. EOM intact. Conjunctivae are not pale. No scleral icterus. No hyphema.   ENT: Mucous membranes are moist. Oropharynx is clear and symmetric.    Neck: Supple. Full ROM.  Cardiovascular: Regular rate. Regular rhythm. No murmurs, rubs, or gallops. Distal pulses are 2+ and symmetric.  Pulmonary/Chest: No respiratory distress. Clear to auscultation bilaterally. No wheezing, rales, or rhonchi.  Abdominal: Soft and non-distended.  There is no tenderness.  No rebound, guarding, or rigidity.  Good bowel sounds.    Musculoskeletal: Moves all extremities. No obvious deformities. No edema. No calf tenderness.  Skin: Warm and dry.  Neurological:  Drowsy but arousable. Disoriented to place and time. Moves all extremities. No facial droop. Cranial nerves II-XII intact.  Psychiatric: Normal affect. Good eye contact. Appropriate in content.    ED Course    Procedures  ED Vital Signs:  Vitals:    01/18/18 1629 01/18/18 1647 01/18/18 1902 01/18/18 2000   BP: (!) 110/52 128/66 (!) 120/59 (!) 147/70   Pulse: 72 71 75 81   Resp: 20 20 20 20   Temp: 98.4 °F (36.9 °C)   99.8 °F (37.7 °C)   TempSrc: Oral   Axillary   SpO2: 96% 95% 95% 100%   Weight: 51.4 kg (113 lb 4 oz)      Height: 4' 11" (1.499 m)       01/18/18 2009   BP:    Pulse: 75   Resp:    Temp:    TempSrc:    SpO2:    Weight:    Height:        Abnormal Lab Results:  Labs Reviewed   CBC W/ AUTO DIFFERENTIAL - Abnormal; Notable for the following:        Result Value    RBC 2.83 (*)     Hemoglobin 9.5 (*)     Hematocrit 29.8 (*)      (*)     MCH 33.6 (*)     MCHC 31.9 (*)     RDW 18.5 (*)     MPV 9.0 (*)     All other components within normal limits   COMPREHENSIVE METABOLIC PANEL - Abnormal; Notable for the following:     Albumin 3.1 (*)     Total Bilirubin 1.2 (*)     Alkaline Phosphatase 41 (*) "     ALT 8 (*)     All other components within normal limits   URINALYSIS - Abnormal; Notable for the following:     Appearance, UA Hazy (*)     Protein, UA Trace (*)     Ketones, UA Trace (*)     Occult Blood UA Trace (*)     Nitrite, UA Positive (*)     Leukocytes, UA 1+ (*)     All other components within normal limits   URINALYSIS MICROSCOPIC - Abnormal; Notable for the following:     WBC, UA 20 (*)     WBC Clumps, UA Occasional (*)     Bacteria, UA Many (*)     All other components within normal limits   PROTIME-INR   APTT        All Lab Results:  Results for orders placed or performed during the hospital encounter of 01/18/18   CBC auto differential   Result Value Ref Range    WBC 8.60 3.90 - 12.70 K/uL    RBC 2.83 (L) 4.00 - 5.40 M/uL    Hemoglobin 9.5 (L) 12.0 - 16.0 g/dL    Hematocrit 29.8 (L) 37.0 - 48.5 %     (H) 82 - 98 fL    MCH 33.6 (H) 27.0 - 31.0 pg    MCHC 31.9 (L) 32.0 - 36.0 g/dL    RDW 18.5 (H) 11.5 - 14.5 %    Platelets 236 150 - 350 K/uL    MPV 9.0 (L) 9.2 - 12.9 fL    Gran # 6.0 1.8 - 7.7 K/uL    Lymph # 1.6 1.0 - 4.8 K/uL    Mono # 0.8 0.3 - 1.0 K/uL    Eos # 0.1 0.0 - 0.5 K/uL    Baso # 0.02 0.00 - 0.20 K/uL    Gran% 70.3 38.0 - 73.0 %    Lymph% 18.7 18.0 - 48.0 %    Mono% 9.5 4.0 - 15.0 %    Eosinophil% 1.3 0.0 - 8.0 %    Basophil% 0.2 0.0 - 1.9 %    Differential Method Automated    Comprehensive metabolic panel   Result Value Ref Range    Sodium 142 136 - 145 mmol/L    Potassium 4.3 3.5 - 5.1 mmol/L    Chloride 104 95 - 110 mmol/L    CO2 27 23 - 29 mmol/L    Glucose 95 70 - 110 mg/dL    BUN, Bld 21 8 - 23 mg/dL    Creatinine 0.7 0.5 - 1.4 mg/dL    Calcium 9.3 8.7 - 10.5 mg/dL    Total Protein 8.0 6.0 - 8.4 g/dL    Albumin 3.1 (L) 3.5 - 5.2 g/dL    Total Bilirubin 1.2 (H) 0.1 - 1.0 mg/dL    Alkaline Phosphatase 41 (L) 55 - 135 U/L    AST 17 10 - 40 U/L    ALT 8 (L) 10 - 44 U/L    Anion Gap 11 8 - 16 mmol/L    eGFR if African American >60 >60 mL/min/1.73 m^2    eGFR if non African  American >60 >60 mL/min/1.73 m^2   Urinalysis   Result Value Ref Range    Specimen UA Urine, Catheterized     Color, UA Yellow Yellow, Straw, Teresa    Appearance, UA Hazy (A) Clear    pH, UA 7.0 5.0 - 8.0    Specific Gravity, UA 1.020 1.005 - 1.030    Protein, UA Trace (A) Negative    Glucose, UA Negative Negative    Ketones, UA Trace (A) Negative    Bilirubin (UA) Negative Negative    Occult Blood UA Trace (A) Negative    Nitrite, UA Positive (A) Negative    Urobilinogen, UA 1.0 <2.0 EU/dL    Leukocytes, UA 1+ (A) Negative   Protime-INR   Result Value Ref Range    Prothrombin Time 12.1 9.0 - 12.5 sec    INR 1.2 0.8 - 1.2   APTT   Result Value Ref Range    aPTT 28.8 21.0 - 32.0 sec   Urinalysis Microscopic   Result Value Ref Range    RBC, UA 0 0 - 4 /hpf    WBC, UA 20 (H) 0 - 5 /hpf    WBC Clumps, UA Occasional (A) None-Rare    Bacteria, UA Many (A) None-Occ /hpf    Microscopic Comment SEE COMMENT      Imaging Results:  Imaging Results          CT Head Without Contrast (Final result)  Result time 01/18/18 18:42:50    Final result by Gennaro Sims MD (Timothy) (01/18/18 18:42:50)                 Impression:         No acute intracranial abnormality    all ct exams at this facility use dose modulation, iterative reconstruction, and /or weight based dosing to reduce radiation dose to low as reasonably achievable.      Electronically signed by: GENNARO SIMS MD  Date:     01/18/18  Time:    18:42              Narrative:    Exam: Noncontrast CT head    History:     Head trauma    Findings: Atrophy is present. No acute intracranial hemorrhage or focal brain parenchymal abnormality is identified. Calvarium is intact.  Motion artifact.  No change compared to 07/08/2017.                             CT Maxillofacial Without Contrast (Final result)  Result time 01/18/18 18:46:34    Final result by Gennaro Sims MD (Timothy) (01/18/18 18:46:34)                 Impression:     Fracture line the tip of the nasal bone.           All CT scans at this facility use dose modulation, iterative reconstruction and/or weight based dosing when appropriate to reduce radiation dose to as low as reasonably achievable.       Electronically signed by: PATTIE SIMS MD  Date:     01/18/18  Time:    18:46              Narrative:    CT MAXILLOFACIAL WITHOUT CONTRAST, 01/18/18 18:32:48    History:Face pain and injury    Standard axial and coronal facial bone CT performed.    No previous for comparison.    There appears to be a fracture involving the tip of the nasal bone.  Nasal septum demonstrates filling but no definite fracture.  Other facial bones are intact.  No retrobulbar or hematoma.  Sinuses appear clear.                               The Emergency Provider reviewed the vital signs and test results, which are outlined above.    ED Discussion       7:23 PM: Reassessed pt at this time. Pt is resting comfortably. D/w patient's family imaging and lab results. Discussed pt dx and plan of tx. Gave pt all f/u and return to the ED instructions. All questions and concerns were addressed at this time. Pt expresses understanding of information and instructions, and is comfortable with plan to discharge. Pt is stable for discharge.      I discussed with patient and/or family/caretaker that evaluation in the ED does not suggest any emergent or life threatening medical conditions requiring immediate intervention beyond what was provided in the ED, and I believe patient is safe for discharge.  Regardless, an unremarkable evaluation in the ED does not preclude the development or presence of a serious of life threatening condition. As such, patient was instructed to return immediately for any worsening or change in current symptoms.    ED Medication(s):  Medications   lorazepam (ATIVAN) injection 0.5 mg (0.5 mg Intravenous Given 1/18/18 1813)   ciprofloxacin HCl tablet 250 mg (250 mg Oral Given 1/18/18 2003)       Discharge Medication List as of 1/18/2018   7:35 PM      START taking these medications    Details   ciprofloxacin HCl (CIPRO) 250 MG tablet Take 1 tablet (250 mg total) by mouth 2 (two) times daily., Starting Thu 1/18/2018, Until Thu 1/25/2018, Print             Follow-up Information     Jackie Lomeli MD In 1 day.    Specialty:  Internal Medicine  Contact information:  [address] [address] [phone]             [facility]    Specialty:  Emergency Medicine  Why:  If symptoms worsen  Contact information:  [address]  [facility] [address] [phone]                  Medical Decision Making    Medical Decision Making:   Clinical Tests:   Lab Tests: Ordered and Reviewed  Radiological Study: Ordered and Reviewed           Scribe Attestation:   Scribe #1: I performed the above scribed service and the documentation accurately describes the services I performed. I attest to the accuracy of the note.    Attending:   Physician Attestation Statement for Scribe #1: I, Uriel Herrera MD, personally performed the services described in this documentation, as scribed by Shweta De La Vega, in my presence, and it is both accurate and complete.          Clinical Impression       ICD-10-CM ICD-9-CM   1. Closed fracture of nasal bone, initial encounter S02.2XXA 802.0   2. Acute cystitis with hematuria N30.01 595.0   3. Facial contusion, initial encounter S00.83XA 920   4. Weakness R53.1 780.79       Disposition:   Disposition: Discharged  Condition: Stable         Uriel Herrera MD  01/19/18 0044

## 2018-01-19 NOTE — ED NOTES
[facility] procedure performed on pt for urine collection. GABY Veras was assisted by GABY Davison. Sterile technique used, betadine used to clean area, 1 attempt, pt tolerated well.

## 2018-01-19 NOTE — ED NOTES
Molly Powell called and spoke with Krystal, which she received report at that time. She advised she would be sending their transportation service/

## 2018-01-23 PROBLEM — K92.0 COFFEE GROUND VOMITING: Status: ACTIVE | Noted: 2018-01-01

## 2018-01-23 PROBLEM — K92.2 ACUTE GI BLEEDING: Status: ACTIVE | Noted: 2018-01-01

## 2018-01-23 PROBLEM — D62 ACUTE BLOOD LOSS ANEMIA: Status: ACTIVE | Noted: 2018-01-01

## 2018-01-23 PROBLEM — E03.9 HYPOTHYROIDISM: Chronic | Status: ACTIVE | Noted: 2018-01-01

## 2018-01-23 PROBLEM — I48.91 ATRIAL FIBRILLATION WITH RVR: Status: ACTIVE | Noted: 2018-01-01

## 2018-01-23 PROBLEM — K92.0 HEMATEMESIS WITHOUT NAUSEA: Status: ACTIVE | Noted: 2018-01-01

## 2018-01-23 NOTE — ED NOTES
Level of Consciousness: The patient is awake, alert, and with appropriate affect and speech; oriented to person. Pt mumbles when asked questions. Pt is frail in appearance.   Appearance: Pt lying in bed with no acute distress noted. Clothing and hygiene are clean and worn appropriately.  Skin: Skin is warm and dry; thin; intact; color consistent with ethnicity.  Mucous membranes are moist.   Musculoskeletal: Moves all extremities well in full range of motion. No obvious deformities or swelling noted.  Respiratory: Airway open and patent, respirations spontaneous, even and unlabored. No accessory muscles in use.   Cardiac: Afib, irregular HR, good pulses palpated peripherally, capillary refill < 3 seconds.  Abdomen: Soft, non-tender to palpation. No distention noted.  Neurologic: PERRLA, face exhibits symmetrical expression, hand grasps equal and even bilaterally, normal sensation noted to all extremities and face when touched by a finger.    Side rails up x 2, call light in reach, bed low and locked. Family at bedside. Will continue to monitor.

## 2018-01-23 NOTE — ED NOTES
Dr Barkley notified of the pt vomiting and is unable to tolerate po medicine. Dr Barkley notified of the coffee ground emesis the pt vomited. Dr Barkley stated to hold the po medication.

## 2018-01-23 NOTE — ASSESSMENT & PLAN NOTE
87 yo female with hematemesis.   Hgb is stable.   Active Afib with RVR.  The case was discussed with Dr. Foley and Dr. Kenny. The plan was to consider EGD this afternoon if Afib is controlled. However, the patient's son discussed it with his siblings and they have elected symptomatic therapy at this time. They would consider EGD if Hgb drops.   Antiemetics as needed.   Continue Protonix IV.

## 2018-01-23 NOTE — ASSESSMENT & PLAN NOTE
- Patient with hematemesis and hemoccult positive.  - No further episodes since arrival to ED.  - Initial H&H 9.5/29.6, stable from H&H on 1/18.  - Monitor serial H&H, and transfuse as needed.  - NPO.  - IV PPI.  - PRN antiemetics.  - Will start octreotide drip if hematemesis reoccurs.  - GI consult in AM.

## 2018-01-23 NOTE — ED NOTES
Pt asleep. Eyes closed. Respirations even. Family denies any needs at this time. Will continue to monitor.

## 2018-01-23 NOTE — ED NOTES
Pt sitting up in bed. NAD. Denies any needs at this time. Call light in reach. Bed lowest position, locked. Will continue to monitor.

## 2018-01-23 NOTE — SUBJECTIVE & OBJECTIVE
Past Medical History:   Diagnosis Date    Anemia     Anxiety     Arthritis     Atrial fibrillation     Cervicalgia     followed by PM&R at [facility]    CHF (congestive heart failure)     Colitis     Compression fracture     Coronary artery disease     Depression     Frequent UTI     Hiatal hernia 7/12/2013    Hypertension     Hypothyroidism     Inguinal hernia     CT Abdomen/pelvis 10/29/2016---Left inguinal hernia containing loops of small bowel.    Lumbago     followed by PM&R at [facility]    Moderate aortic regurgitation 12/8/2014    Stable and treated with medications and followed by Cardiology     Non-rheumatic mitral regurgitation moderate-severe 9/16/2013    Osteoporosis 12/8/2014    Pacemaker     Peripheral vascular disease     Pneumonia     Pneumonia due to other specified bacteria(482.89)     Spinal cord disease     Suicide and self-inflicted injury by other specified means 1986 approx    took 's antianxiety  more of unintentinal OD. per patient    Thoracic or lumbosacral neuritis or radiculitis, unspecified     followed by PM&R at [facility]    Trouble in sleeping     Urinary incontinence        Past Surgical History:   Procedure Laterality Date    APPENDECTOMY  1958    BACK SURGERY      BREAST SURGERY Right 1959 approx    Bx - benign    EYE SURGERY Bilateral 2014    cataract w/ IOL Lizette Ghosh eye MD    HYSTERECTOMY  1958    IZABELA for fibroids , risk for cancer and prolapse    JOINT REPLACEMENT Bilateral 2000 approx    knee    pacemaker with defibrillator Left     SPINE SURGERY      lumbar disc x 2       Review of patient's allergies indicates:   Allergen Reactions    Alendronate      abdominal pains    Amoxicillin-pot clavulanate Nausea And Vomiting    Codeine     Morphine      Difficulty breathing    Olmesartan      Nausea    Propoxyphene n-acetaminophen      Other reaction(s): Unable to obtain    Sulfa  (sulfonamide antibiotics)      Stomach upset, Nausea    Zofran [ondansetron hcl (pf)]      Family History     Problem Relation (Age of Onset)    Dementia Father, Brother    Heart disease Mother, Brother, Sister    Hypertension Sister    Mental illness Paternal Aunt    Stroke Sister        Social History Main Topics    Smoking status: Never Smoker    Smokeless tobacco: Never Used    Alcohol use No    Drug use: No    Sexual activity: No     Review of Systems   Unable to perform ROS: Dementia     Objective:     Vital Signs (Most Recent):  Temp: 99 °F (37.2 °C) (01/23/18 0901)  Pulse: 109 (01/23/18 0902)  Resp: (!) 30 (01/23/18 0902)  BP: (!) 144/65 (01/23/18 0902)  SpO2: (!) 92 % (01/23/18 0902) Vital Signs (24h Range):  Temp:  [99 °F (37.2 °C)-100 °F (37.8 °C)] 99 °F (37.2 °C)  Pulse:  [] 109  Resp:  [18-34] 30  SpO2:  [89 %-96 %] 92 %  BP: (129-147)/(59-84) 144/65        There is no height or weight on file to calculate BMI.      Intake/Output Summary (Last 24 hours) at 01/23/18 1203  Last data filed at 01/23/18 1104   Gross per 24 hour   Intake           259.33 ml   Output                0 ml   Net           259.33 ml       Lines/Drains/Airways     Pressure Ulcer                 Pressure Injury 06/05/15 2000 medial coccyx Stage I 962 days          Peripheral Intravenous Line                 Peripheral IV - Single Lumen 06/01/17 1202 Right Forearm 236 days         Peripheral IV - Single Lumen 07/26/17 1636 Left Forearm 180 days         Peripheral IV - Single Lumen 01/22/18 2235 Right Forearm less than 1 day         Peripheral IV - Single Lumen 01/22/18 2245 Right Antecubital less than 1 day                Physical Exam   Constitutional: She appears cachectic. She appears ill.   HENT:   Bruising near left eye   Eyes: EOM are normal.   Cardiovascular: An irregularly irregular rhythm present. Tachycardia present.    Pulmonary/Chest: Effort normal and breath sounds normal. No respiratory distress.    Abdominal: Soft. Bowel sounds are normal. She exhibits no distension. There is tenderness. There is no rebound and no guarding.   Neurological:   Wakes to name but not oriented and doesn't follow commands.        Significant Labs:  CBC:   Recent Labs  Lab 01/22/18 2235 01/23/18  0213 01/23/18  0524   WBC 13.20* 12.54 13.02*   HGB 9.5* 9.2* 9.2*  9.2*   HCT 29.6* 28.7* 28.0*  28.0*    250 277     CMP:   Recent Labs  Lab 01/22/18 2235   *   CALCIUM 9.1   ALBUMIN 3.3*   PROT 7.8      K 3.6   CO2 28      BUN 19   CREATININE 0.7   ALKPHOS 38*   ALT 9*   AST 17   BILITOT 0.6     Coagulation:   Recent Labs  Lab 01/22/18 2235   INR 1.3*   APTT 25.2       Significant Imaging:  Imaging results within the past 24 hours have been reviewed.

## 2018-01-23 NOTE — ASSESSMENT & PLAN NOTE
- Remains hemodynamically stable and asymptomatic.   - K+ WNL.  - Check Mg and TSH.  - Diltiazem drip initiated in ED.  - Resume home metoprolol tartrate 100mg BID and diltiazem CD 300mg daily.  - Monitor telemetry and wean diltiazem drip as tolerated.  - No AC due to acute GI bleed + frequent falls.

## 2018-01-23 NOTE — H&P
[facility] Hospital Medicine  History & Physical    Patient Name: Tika Nevarez  MRN: [id number]  Admission Date: 1/22/2018  Attending Physician: Francisco J Barkley MD   Primary Care Provider: Jackie Lomeli MD         Patient information was obtained from patient, relative(s), past medical records and ER records.     Subjective:     Principal Problem:Acute blood loss anemia    Chief Complaint:   Chief Complaint   Patient presents with    Emesis     coffee ground emesis. being treated for UTI        HPI: Ms. Nevarez is an 89yo female  with a PMHx of chronic A-Fib, HTN, CAD, chronic combined systolic/diastolic HFrEF of 40%, moderate TR, hypothyroidism, and iron deficiency anemia, who presented to the ED with c/o coffee ground emesis x 1 episode yesterday.  Associated nausea.  No aggravating or alleviating factors.  Denies any ABD pain or distention, diarrhea, constipation, rectal bleeding, melena/hematochezia, dysuria, hematuria, weakness, CP, palpitations, SOB, cough, lightheadedness/dizziness, syncope, HA, AMS, focal deficits, fever, or chills.  Patient's son reports she was seen in ED on 1/18/18 after an unwitnessed fall.  At that time, she was diagnosed with nasal bone fracture and UTI, and sent home on PO Cipro.  Initial work-up in ED resulted H&H 9.5/29.6, INR 1.3, influenza negative, hemoccult positive.  While in ED, patient noted to be tachycardic with HR as high as 140bpm.  EKG revealed A-Fib with RVR.  Diltiazem 20mg IV given, and patient started on diltiazem drip at 10mg/hr.  Hospital Medicine was consulted for admission.  Patient's has been off blood thinners for quite some time due to frequent falls. No further episodes of hematemesis since arrival to ED.      Past Medical History:   Diagnosis Date    Anemia     Anxiety     Arthritis     Atrial fibrillation     Cervicalgia     [facility]    CHF (congestive heart failure)     Colitis      Compression fracture     Coronary artery disease     Depression     Frequent UTI     Hiatal hernia 7/12/2013    Hypertension     Hypothyroidism     Inguinal hernia     CT Abdomen/pelvis 10/29/2016---Left inguinal hernia containing loops of small bowel.    Lumbago     [facility]    Moderate aortic regurgitation 12/8/2014    Stable and treated with medications and followed by Cardiology     Non-rheumatic mitral regurgitation moderate-severe 9/16/2013    Osteoporosis 12/8/2014    Pacemaker     Peripheral vascular disease     Pneumonia     Pneumonia due to other specified bacteria(482.89)     Spinal cord disease     Suicide and self-inflicted injury by other specified means 1986 approx    took 's antianxiety  more of unintentinal OD. per patient    Thoracic or lumbosacral neuritis or radiculitis, unspecified     [facility]    Trouble in sleeping     Urinary incontinence        Past Surgical History:   Procedure Laterality Date    APPENDECTOMY  1958    BACK SURGERY      BREAST SURGERY Right 1959 approx    Bx - benign    EYE SURGERY Bilateral 2014    cataract w/ IOL Lizette Ghosh eye MD    HYSTERECTOMY  1958    IZABELA for fibroids , risk for cancer and prolapse    JOINT REPLACEMENT Bilateral 2000 approx    knee    pacemaker with defibrillator Left     SPINE SURGERY      lumbar disc x 2       Review of patient's allergies indicates:   Allergen Reactions    Alendronate      abdominal pains    Amoxicillin-pot clavulanate Nausea And Vomiting    Codeine     Morphine      Difficulty breathing    Olmesartan      Nausea    Propoxyphene n-acetaminophen      Other reaction(s): Unable to obtain    Sulfa (sulfonamide antibiotics)      Stomach upset, Nausea    Zofran [ondansetron hcl (pf)]        Current Facility-Administered Medications on File Prior to Encounter   Medication    denosumab (PROLIA) injection 60 mg     Current  Outpatient Prescriptions on File Prior to Encounter   Medication Sig    ascorbic acid, vitamin C, (VITAMIN C) 500 MG tablet Take 500 mg by mouth once daily.    cetirizine (ZYRTEC) 10 MG tablet Take 10 mg by mouth once daily.    ciprofloxacin HCl (CIPRO) 250 MG tablet Take 1 tablet (250 mg total) by mouth 2 (two) times daily.    clotrimazole-betamethasone 1-0.05% (LOTRISONE) cream Apply topically 2 (two) times daily.    diltiaZEM (CARDIZEM CD) 300 MG 24 hr capsule TAKE 1 CAPSULE (300 MG TOTAL) BY MOUTH ONCE DAILY.    docusate sodium (COLACE) 50 MG capsule Take by mouth 2 (two) times daily as needed.     duloxetine (CYMBALTA) 30 MG capsule Take 30 mg by mouth 2 (two) times daily.     fluticasone (FLONASE) 50 mcg/actuation nasal spray 1 spray by Each Nare route once daily. (Patient taking differently: 1 spray by Each Nare route daily as needed. )    furosemide (LASIX) 20 MG tablet Take 1 tablet (20 mg total) by mouth once daily.    hydrocodone-acetaminophen 5-325mg (NORCO) 5-325 mg per tablet Take 1 tablet by mouth 3 (three) times daily.     lactulose (CHRONULAC) 10 gram/15 mL solution Take by mouth as needed.    levothyroxine (SYNTHROID) 75 MCG tablet TAKE 1 TABLET (75 MCG TOTAL) BY MOUTH ONCE DAILY.    megestrol (MEGACE) 400 mg/10 mL (40 mg/mL) Susp     metoprolol tartrate (LOPRESSOR) 100 MG tablet TAKE 1 TABLET (100 MG TOTAL) BY MOUTH 2 (TWO) TIMES DAILY.    montelukast (SINGULAIR) 10 mg tablet Take 1 tablet (10 mg total) by mouth once daily.    oseltamivir (TAMIFLU) 75 MG capsule Take 75 mg by mouth.    pantoprazole (PROTONIX) 40 MG tablet TAKE 1 TABLET BY MOUTH EVERY DAY 30 MINUTES PRIOR TO BREAKFAST    polyethylene glycol (GLYCOLAX) 17 gram PwPk Take 17 g by mouth continuous prn.    ranitidine (ZANTAC) 300 MG tablet TAKE 1 TABLET BY MOUTH EVERY DAY    vitamin D 1000 units Tab Take 185 mg by mouth once daily.    zinc sulfate (ZINCATE) 220 (50) mg capsule Take 220 mg by mouth once daily.      Family History     Problem Relation (Age of Onset)    Dementia Father, Brother    Heart disease Mother, Brother, Sister    Hypertension Sister    Mental illness Paternal Aunt    Stroke Sister        Social History Main Topics    Smoking status: Never Smoker    Smokeless tobacco: Never Used    Alcohol use No    Drug use: No    Sexual activity: No     Review of Systems   Constitutional: Negative for activity change, chills, diaphoresis, fatigue, fever and unexpected weight change.   HENT: Negative for congestion, postnasal drip, rhinorrhea, sore throat and trouble swallowing.    Eyes: Negative for photophobia and visual disturbance.   Respiratory: Negative for apnea, cough, chest tightness, shortness of breath and wheezing.    Cardiovascular: Negative for chest pain, palpitations and leg swelling.   Gastrointestinal: Positive for nausea and vomiting. Negative for abdominal distention, abdominal pain, anal bleeding, blood in stool, constipation, diarrhea and rectal pain.        Coffee ground emesis   Endocrine: Negative for polydipsia, polyphagia and polyuria.   Genitourinary: Negative for decreased urine volume, difficulty urinating, dysuria, enuresis, flank pain, frequency, hematuria and urgency.   Musculoskeletal: Positive for back pain (chronic). Negative for arthralgias, gait problem, joint swelling and myalgias.   Skin: Negative for pallor, rash and wound.   Neurological: Negative for dizziness, seizures, syncope, speech difficulty, weakness, light-headedness, numbness and headaches.   Psychiatric/Behavioral: Negative for agitation, confusion, hallucinations and sleep disturbance. The patient is not nervous/anxious.    All other systems reviewed and are negative.    Objective:     Vital Signs (Most Recent):  Temp: 99.3 °F (37.4 °C) (01/23/18 0217)  Pulse: (!) 116 (01/23/18 0102)  Resp: (!) 29 (01/23/18 0102)  BP: 130/69 (01/23/18 0102)  SpO2: 95 % (01/23/18 0102) Vital Signs (24h Range):  Temp:  [99.3 °F  (37.4 °C)-100 °F (37.8 °C)] 99.3 °F (37.4 °C)  Pulse:  [] 116  Resp:  [18-29] 29  SpO2:  [92 %-95 %] 95 %  BP: (130-146)/(69-84) 130/69        There is no height or weight on file to calculate BMI.    Physical Exam   Constitutional: She is oriented to person, place, and time. She appears well-developed and well-nourished. No distress.   HENT:   Head: Normocephalic and atraumatic.   Eyes: Conjunctivae are normal.   Neck: Normal range of motion. Neck supple. No JVD present.   Cardiovascular: Intact distal pulses.  An irregularly irregular rhythm present. Tachycardia present.    Murmur heard.   Systolic murmur is present with a grade of 2/6   Pulses:       Radial pulses are 2+ on the right side, and 2+ on the left side.        Dorsalis pedis pulses are 2+ on the right side, and 2+ on the left side.        Posterior tibial pulses are 2+ on the right side, and 2+ on the left side.   Normal S1, variable S2.   Pulmonary/Chest: Effort normal and breath sounds normal. No accessory muscle usage. No tachypnea. No respiratory distress. She has no wheezes. She has no rhonchi. She has no rales.   Abdominal: Soft. Bowel sounds are normal. She exhibits no distension. There is no tenderness. There is no rebound, no guarding and no CVA tenderness.   Musculoskeletal: Normal range of motion. She exhibits no edema, tenderness or deformity.   Neurological: She is alert and oriented to person, place, and time. No cranial nerve deficit or sensory deficit.   No focal deficits.   Skin: Skin is warm, dry and intact. Capillary refill takes less than 2 seconds. No rash noted. She is not diaphoretic. No cyanosis or erythema.   Psychiatric: She has a normal mood and affect. Her speech is normal and behavior is normal.   Nursing note and vitals reviewed.          Significant Labs:   Results for orders placed or performed during the hospital encounter of 01/22/18   CBC auto differential   Result Value Ref Range    WBC 13.20 (H) 3.90 - 12.70  K/uL    RBC 2.85 (L) 4.00 - 5.40 M/uL    Hemoglobin 9.5 (L) 12.0 - 16.0 g/dL    Hematocrit 29.6 (L) 37.0 - 48.5 %     (H) 82 - 98 fL    MCH 33.3 (H) 27.0 - 31.0 pg    MCHC 32.1 32.0 - 36.0 g/dL    RDW 18.7 (H) 11.5 - 14.5 %    Platelets 264 150 - 350 K/uL    MPV 8.7 (L) 9.2 - 12.9 fL    Gran # 11.6 (H) 1.8 - 7.7 K/uL    Lymph # 0.8 (L) 1.0 - 4.8 K/uL    Mono # 0.8 0.3 - 1.0 K/uL    Eos # 0.0 0.0 - 0.5 K/uL    Baso # 0.02 0.00 - 0.20 K/uL    Gran% 88.1 (H) 38.0 - 73.0 %    Lymph% 6.1 (L) 18.0 - 48.0 %    Mono% 5.9 4.0 - 15.0 %    Eosinophil% 0.1 0.0 - 8.0 %    Basophil% 0.2 0.0 - 1.9 %    Platelet Estimate Appears normal     Aniso Slight     Poik Slight     Hypo Occasional     Ovalocytes Occasional     Tear Drop Cells Occasional     Stomatocytes Present     Differential Method Automated    Comprehensive metabolic panel   Result Value Ref Range    Sodium 144 136 - 145 mmol/L    Potassium 3.6 3.5 - 5.1 mmol/L    Chloride 103 95 - 110 mmol/L    CO2 28 23 - 29 mmol/L    Glucose 124 (H) 70 - 110 mg/dL    BUN, Bld 19 8 - 23 mg/dL    Creatinine 0.7 0.5 - 1.4 mg/dL    Calcium 9.1 8.7 - 10.5 mg/dL    Total Protein 7.8 6.0 - 8.4 g/dL    Albumin 3.3 (L) 3.5 - 5.2 g/dL    Total Bilirubin 0.6 0.1 - 1.0 mg/dL    Alkaline Phosphatase 38 (L) 55 - 135 U/L    AST 17 10 - 40 U/L    ALT 9 (L) 10 - 44 U/L    Anion Gap 13 8 - 16 mmol/L    eGFR if African American >60 >60 mL/min/1.73 m^2    eGFR if non African American >60 >60 mL/min/1.73 m^2   Protime-INR   Result Value Ref Range    Prothrombin Time 13.7 (H) 9.0 - 12.5 sec    INR 1.3 (H) 0.8 - 1.2   APTT   Result Value Ref Range    aPTT 25.2 21.0 - 32.0 sec   Influenza antigen Nasopharyngeal Swab   Result Value Ref Range    Influenza A Ag, EIA Negative Negative    Influenza B Ag, EIA Negative Negative    Flu A & B Source Nasopharyngeal Swab    Occult blood, other sources   Result Value Ref Range    Occult Blood Positive (A) Negative   CBC auto differential   Result Value Ref Range     WBC 12.54 3.90 - 12.70 K/uL    RBC 2.75 (L) 4.00 - 5.40 M/uL    Hemoglobin 9.2 (L) 12.0 - 16.0 g/dL    Hematocrit 28.7 (L) 37.0 - 48.5 %     (H) 82 - 98 fL    MCH 33.5 (H) 27.0 - 31.0 pg    MCHC 32.1 32.0 - 36.0 g/dL    RDW 18.7 (H) 11.5 - 14.5 %    Platelets 250 150 - 350 K/uL    MPV 8.6 (L) 9.2 - 12.9 fL    Gran # 11.2 (H) 1.8 - 7.7 K/uL    Lymph # 0.7 (L) 1.0 - 4.8 K/uL    Mono # 0.6 0.3 - 1.0 K/uL    Eos # 0.0 0.0 - 0.5 K/uL    Baso # 0.02 0.00 - 0.20 K/uL    Gran% 89.0 (H) 38.0 - 73.0 %    Lymph% 5.8 (L) 18.0 - 48.0 %    Mono% 4.9 4.0 - 15.0 %    Eosinophil% 0.1 0.0 - 8.0 %    Basophil% 0.2 0.0 - 1.9 %    Differential Method Automated    Type & Screen   Result Value Ref Range    Group & Rh O POS     Indirect Rainer NEG       All pertinent labs within the past 24 hours have been reviewed.    Significant Imaging:   Imaging Results          X-Ray Abdomen Flat And Erect (In process)                I have reviewed all pertinent imaging results/findings within the past 24 hours.     EKG: (personally reviewed)  Atrial fibrillation with RVR, no acute ST-T changes from previous tracings.        Assessment/Plan:     * Acute blood loss anemia secondary to acute GI bleed    - Patient with hematemesis and hemoccult positive.  - No further episodes since arrival to ED.  - Initial H&H 9.5/29.6, stable from H&H on 1/18.  - Monitor serial H&H, and transfuse as needed.  - NPO.  - IV PPI.  - PRN antiemetics.  - Will start octreotide drip if hematemesis reoccurs.  - GI consult in AM.        Atrial fibrillation with RVR    - Remains hemodynamically stable and asymptomatic.  - K+ WNL.  - Check Mg and TSH.  - Diltiazem drip initiated in ED.  - Resume home metoprolol tartrate 100mg BID and diltiazem CD 300mg daily.  - Monitor telemetry and wean diltiazem drip as tolerated.  - No AC due to acute GI bleed + frequent falls.        Hypothyroidism    - Continue home Synthroid.  - Check TSH.        Essential hypertension    - BP  stable.  - Continue home beta blocker and CCB.  - Monitor BP trends.        Chronic combined systolic and diastolic congestive heart failure    - Clinically compensated currently.  - Will hold home diuretics given acute GI bleed.  - BP and HR control.  - Strict I&O's, daily weights.  - Monitor closely for volume overload.          VTE Risk Mitigation     SCD's             CLARIBEL Hobson  Department of Hospital Medicine   [facility]

## 2018-01-23 NOTE — PROGRESS NOTES
"Pt seen and examined today at bedside in the ER. The patient is currently afib with RVR on the monitor, on a Cardizem gtt. H/H is stable. The patient is on a Sandastatin gtt. GI consult. Plan of care discussed with the family at bedside who wish to hold off on endoscopy at this time and "get the a-fib under control" and then revisit the need for endoscopy.   "

## 2018-01-23 NOTE — ED NOTES
Loreta Bartlett notified of the pt's inability to take po medication due to vomiting. Dhruv was notified of several small episodes of coffee ground emesis.

## 2018-01-23 NOTE — CONSULTS
[facility] -   Gastroenterology  Consult Note    Patient Name: Tika Nevarez  MRN: [id number]  Admission Date: 1/22/2018  Hospital Length of Stay: 0 days  Code Status: Full Code   Attending Provider: Francisco J Barkley MD   Consulting Provider: Loyda Muñiz PA-C  Primary Care Physician: Jackie Lomeli MD  Principal Problem:Acute blood loss anemia    Inpatient consult to Gastroenterology  Consult performed by: LOYDA MUÑIZ  Consult ordered by: SRINIVASA MCKENZIE  Reason for consult: Hematemesis        Subjective:     HPI:  The patient presented to the ER for hematemesis. She is a nursing home resident and reportedly started vomiting yesterday evening. She is unable to provide a history. It is obtained from her son, medical staff and medical record. The patient reportedly complained of epigastric pain. She hasn't been eating well, but this change began months ago. There is no report of melena or hematochezia. Her Hgb in the ER was 9.5 which is the same it was five days ago when she came in after a fall. However, her Hgb in July was around 11. The current Hgb has remained stable overnight. BUN and creatinine are within normal range. She was started on IV Protonix. The family denies use of NSAIDs or blood thinners. She takes Protonix in the morning and Zantac in the evenings. Of note, she currently has Afib with RVR and hospital med plans to start Cardizem.     Past Medical History:   Diagnosis Date    Anemia     Anxiety     Arthritis     Atrial fibrillation     Cervicalgia     followed [facility] PM&[facility] at [facility]    CHF (congestive heart failure)     Colitis     Compression fracture     Coronary artery disease     Depression     Frequent UTI     Hiatal hernia 7/12/2013    Hypertension     Hypothyroidism     Inguinal hernia     CT Abdomen/pelvis 10/29/2016---Left inguinal hernia containing loops of small bowel.    Lumbago     followed by PM&R at [facility]     Moderate aortic regurgitation 12/8/2014    Stable and treated with medications and followed by Cardiology     Non-rheumatic mitral regurgitation moderate-severe 9/16/2013    Osteoporosis 12/8/2014    Pacemaker     Peripheral vascular disease     Pneumonia     Pneumonia due to other specified bacteria(482.89)     Spinal cord disease     Suicide and self-inflicted injury by other specified means 1986 approx    took 's antianxiety  more of unintentinal OD. per patient    Thoracic or lumbosacral neuritis or radiculitis, unspecified     followed by [facility] at [facility]    Trouble in sleeping     Urinary incontinence        Past Surgical History:   Procedure Laterality Date    APPENDECTOMY  1958    BACK SURGERY      BREAST SURGERY Right 1959 approx    Bx - benign    EYE SURGERY Bilateral 2014    cataract w/ IOL Lizette Ghosh eye MD    HYSTERECTOMY  1958    IZABELA for fibroids , risk for cancer and prolapse    JOINT REPLACEMENT Bilateral 2000 approx    knee    pacemaker with defibrillator Left     SPINE SURGERY      lumbar disc x 2       Review of patient's allergies indicates:   Allergen Reactions    Alendronate      abdominal pains    Amoxicillin-pot clavulanate Nausea And Vomiting    Codeine     Morphine      Difficulty breathing    Olmesartan      Nausea    Propoxyphene n-acetaminophen      Other reaction(s): Unable to obtain    Sulfa (sulfonamide antibiotics)      Stomach upset, Nausea    Zofran [ondansetron hcl (pf)]      Family History     Problem Relation (Age of Onset)    Dementia Father, Brother    Heart disease Mother, Brother, Sister    Hypertension Sister    Mental illness Paternal Aunt    Stroke Sister        Social History Main Topics    Smoking status: Never Smoker    Smokeless tobacco: Never Used    Alcohol use No    Drug use: No    Sexual activity: No     Review of Systems   Unable to perform ROS: Dementia     Objective:     Vital Signs (Most Recent):  Temp:  99 °F (37.2 °C) (01/23/18 0901)  Pulse: 109 (01/23/18 0902)  Resp: (!) 30 (01/23/18 0902)  BP: (!) 144/65 (01/23/18 0902)  SpO2: (!) 92 % (01/23/18 0902) Vital Signs (24h Range):  Temp:  [99 °F (37.2 °C)-100 °F (37.8 °C)] 99 °F (37.2 °C)  Pulse:  [] 109  Resp:  [18-34] 30  SpO2:  [89 %-96 %] 92 %  BP: (129-147)/(59-84) 144/65        There is no height or weight on file to calculate BMI.      Intake/Output Summary (Last 24 hours) at 01/23/18 1203  Last data filed at 01/23/18 1104   Gross per 24 hour   Intake           259.33 ml   Output                0 ml   Net           259.33 ml       Lines/Drains/Airways     Pressure Ulcer                 Pressure Injury 06/05/15 2000 medial coccyx Stage I 962 days          Peripheral Intravenous Line                 Peripheral IV - Single Lumen 06/01/17 1202 Right Forearm 236 days         Peripheral IV - Single Lumen 07/26/17 1636 Left Forearm 180 days         Peripheral IV - Single Lumen 01/22/18 2235 Right Forearm less than 1 day         Peripheral IV - Single Lumen 01/22/18 2245 Right Antecubital less than 1 day                Physical Exam   Constitutional: She appears cachectic. She appears ill.   HENT:   Bruising near left eye   Eyes: EOM are normal.   Cardiovascular: An irregularly irregular rhythm present. Tachycardia present.    Pulmonary/Chest: Effort normal and breath sounds normal. No respiratory distress.   Abdominal: Soft. Bowel sounds are normal. She exhibits no distension. There is tenderness. There is no rebound and no guarding.   Neurological:   Wakes to name but not oriented and doesn't follow commands.        Significant Labs:  CBC:   Recent Labs  Lab 01/22/18  2235 01/23/18  0213 01/23/18  0524   WBC 13.20* 12.54 13.02*   HGB 9.5* 9.2* 9.2*  9.2*   HCT 29.6* 28.7* 28.0*  28.0*    250 277     CMP:   Recent Labs  Lab 01/22/18  2235   *   CALCIUM 9.1   ALBUMIN 3.3*   PROT 7.8      K 3.6   CO2 28      BUN 19   CREATININE 0.7    ALKPHOS 38*   ALT 9*   AST 17   BILITOT 0.6     Coagulation:   Recent Labs  Lab 01/22/18  2235   INR 1.3*   APTT 25.2       Significant Imaging:  Imaging results within the past 24 hours have been reviewed.    Assessment/Plan:     * Acute blood loss anemia secondary to acute GI bleed    Monitor H/H and transfuse if indicated.         Hematemesis without nausea    89 yo female with hematemesis.   Hgb is stable.   Active Afib with RVR.  The case was discussed with Dr. Foley and Dr. Kenny. The plan was to consider EGD this afternoon if Afib is controlled. However, the patient's son discussed it with his siblings and they have elected symptomatic therapy at this time. They would consider EGD if Hgb drops.   Antiemetics as needed.   Continue Protonix IV.         Atrial fibrillation with RVR    Management per .             Thank you for your consult. I will follow-up with patient. Please contact us if you have any additional questions.    Eduardo Muñiz PA-C  Gastroenterology  [facility] [place]

## 2018-01-23 NOTE — SUBJECTIVE & OBJECTIVE
Past Medical History:   Diagnosis Date    Anemia     Anxiety     Arthritis     Atrial fibrillation     Cervicalgia     followed by PM&[facility] at [facility]    CHF (congestive heart failure)     Colitis     Compression fracture     Coronary artery disease     Depression     Frequent UTI     Hiatal hernia 7/12/2013    Hypertension     Hypothyroidism     Inguinal hernia     CT Abdomen/pelvis 10/29/2016---Left inguinal hernia containing loops of small bowel.    Lumbago     followed by PM&R at [facility]    Moderate aortic regurgitation 12/8/2014    Stable and treated with medications and followed by Cardiology     Non-rheumatic mitral regurgitation moderate-severe 9/16/2013    Osteoporosis 12/8/2014    Pacemaker     Peripheral vascular disease     Pneumonia     Pneumonia due to other specified bacteria(482.89)     Spinal cord disease     Suicide and self-inflicted injury by other specified means 1986 approx    took 's antianxiety  more of unintentinal OD. per patient    Thoracic or lumbosacral neuritis or radiculitis, unspecified     followed by PM&R at [facility]    Trouble in sleeping     Urinary incontinence        Past Surgical History:   Procedure Laterality Date    APPENDECTOMY  1958    BACK SURGERY      BREAST SURGERY Right 1959 approx    Bx - benign    EYE SURGERY Bilateral 2014    cataract w/ IOL Lizette Ghosh eye MD    HYSTERECTOMY  1958    IZABELA for fibroids , risk for cancer and prolapse    JOINT REPLACEMENT Bilateral 2000 approx    knee    pacemaker with defibrillator Left     SPINE SURGERY      lumbar disc x 2       Review of patient's allergies indicates:   Allergen Reactions    Alendronate      abdominal pains    Amoxicillin-pot clavulanate Nausea And Vomiting    Codeine     Morphine      Difficulty breathing    Olmesartan      Nausea    Propoxyphene n-acetaminophen      Other reaction(s): Unable to obtain    Sulfa  (sulfonamide antibiotics)      Stomach upset, Nausea    Zofran [ondansetron hcl (pf)]        Current Facility-Administered Medications on File Prior to Encounter   Medication    denosumab (PROLIA) injection 60 mg     Current Outpatient Prescriptions on File Prior to Encounter   Medication Sig    ascorbic acid, vitamin C, (VITAMIN C) 500 MG tablet Take 500 mg by mouth once daily.    cetirizine (ZYRTEC) 10 MG tablet Take 10 mg by mouth once daily.    ciprofloxacin HCl (CIPRO) 250 MG tablet Take 1 tablet (250 mg total) by mouth 2 (two) times daily.    clotrimazole-betamethasone 1-0.05% (LOTRISONE) cream Apply topically 2 (two) times daily.    diltiaZEM (CARDIZEM CD) 300 MG 24 hr capsule TAKE 1 CAPSULE (300 MG TOTAL) BY MOUTH ONCE DAILY.    docusate sodium (COLACE) 50 MG capsule Take by mouth 2 (two) times daily as needed.     duloxetine (CYMBALTA) 30 MG capsule Take 30 mg by mouth 2 (two) times daily.     fluticasone (FLONASE) 50 mcg/actuation nasal spray 1 spray by Each Nare route once daily. (Patient taking differently: 1 spray by Each Nare route daily as needed. )    furosemide (LASIX) 20 MG tablet Take 1 tablet (20 mg total) by mouth once daily.    hydrocodone-acetaminophen 5-325mg (NORCO) 5-325 mg per tablet Take 1 tablet by mouth 3 (three) times daily.     lactulose (CHRONULAC) 10 gram/15 mL solution Take by mouth as needed.    levothyroxine (SYNTHROID) 75 MCG tablet TAKE 1 TABLET (75 MCG TOTAL) BY MOUTH ONCE DAILY.    megestrol (MEGACE) 400 mg/10 mL (40 mg/mL) Susp     metoprolol tartrate (LOPRESSOR) 100 MG tablet TAKE 1 TABLET (100 MG TOTAL) BY MOUTH 2 (TWO) TIMES DAILY.    montelukast (SINGULAIR) 10 mg tablet Take 1 tablet (10 mg total) by mouth once daily.    oseltamivir (TAMIFLU) 75 MG capsule Take 75 mg by mouth.    pantoprazole (PROTONIX) 40 MG tablet TAKE 1 TABLET BY MOUTH EVERY DAY 30 MINUTES PRIOR TO BREAKFAST    polyethylene glycol (GLYCOLAX) 17 gram PwPk Take 17 g by mouth  continuous prn.    ranitidine (ZANTAC) 300 MG tablet TAKE 1 TABLET BY MOUTH EVERY DAY    vitamin D 1000 units Tab Take 185 mg by mouth once daily.    zinc sulfate (ZINCATE) 220 (50) mg capsule Take 220 mg by mouth once daily.     Family History     Problem Relation (Age of Onset)    Dementia Father, Brother    Heart disease Mother, Brother, Sister    Hypertension Sister    Mental illness Paternal Aunt    Stroke Sister        Social History Main Topics    Smoking status: Never Smoker    Smokeless tobacco: Never Used    Alcohol use No    Drug use: No    Sexual activity: No     Review of Systems   Constitutional: Negative for activity change, chills, diaphoresis, fatigue, fever and unexpected weight change.   HENT: Negative for congestion, postnasal drip, rhinorrhea, sore throat and trouble swallowing.    Eyes: Negative for photophobia and visual disturbance.   Respiratory: Negative for apnea, cough, chest tightness, shortness of breath and wheezing.    Cardiovascular: Negative for chest pain, palpitations and leg swelling.   Gastrointestinal: Positive for nausea and vomiting. Negative for abdominal distention, abdominal pain, anal bleeding, blood in stool, constipation, diarrhea and rectal pain.        Coffee ground emesis   Endocrine: Negative for polydipsia, polyphagia and polyuria.   Genitourinary: Negative for decreased urine volume, difficulty urinating, dysuria, enuresis, flank pain, frequency, hematuria and urgency.   Musculoskeletal: Positive for back pain (chronic). Negative for arthralgias, gait problem, joint swelling and myalgias.   Skin: Negative for pallor, rash and wound.   Neurological: Negative for dizziness, seizures, syncope, speech difficulty, weakness, light-headedness, numbness and headaches.   Psychiatric/Behavioral: Negative for agitation, confusion, hallucinations and sleep disturbance. The patient is not nervous/anxious.    All other systems reviewed and are negative.    Objective:      Vital Signs (Most Recent):  Temp: 99.3 °F (37.4 °C) (01/23/18 0217)  Pulse: (!) 116 (01/23/18 0102)  Resp: (!) 29 (01/23/18 0102)  BP: 130/69 (01/23/18 0102)  SpO2: 95 % (01/23/18 0102) Vital Signs (24h Range):  Temp:  [99.3 °F (37.4 °C)-100 °F (37.8 °C)] 99.3 °F (37.4 °C)  Pulse:  [] 116  Resp:  [18-29] 29  SpO2:  [92 %-95 %] 95 %  BP: (130-146)/(69-84) 130/69        There is no height or weight on file to calculate BMI.    Physical Exam   Constitutional: She is oriented to person, place, and time. She appears well-developed and well-nourished. No distress.   HENT:   Head: Normocephalic and atraumatic.   Eyes: Conjunctivae are normal.   Neck: Normal range of motion. Neck supple. No JVD present.   Cardiovascular: Intact distal pulses.  An irregularly irregular rhythm present. Tachycardia present.    Murmur heard.   Systolic murmur is present with a grade of 2/6   Pulses:       Radial pulses are 2+ on the right side, and 2+ on the left side.        Dorsalis pedis pulses are 2+ on the right side, and 2+ on the left side.        Posterior tibial pulses are 2+ on the right side, and 2+ on the left side.   Normal S1, variable S2.   Pulmonary/Chest: Effort normal and breath sounds normal. No accessory muscle usage. No tachypnea. No respiratory distress. She has no wheezes. She has no rhonchi. She has no rales.   Abdominal: Soft. Bowel sounds are normal. She exhibits no distension. There is no tenderness. There is no rebound, no guarding and no CVA tenderness.   Musculoskeletal: Normal range of motion. She exhibits no edema, tenderness or deformity.   Neurological: She is alert and oriented to person, place, and time. No cranial nerve deficit or sensory deficit.   No focal deficits.   Skin: Skin is warm, dry and intact. Capillary refill takes less than 2 seconds. No rash noted. She is not diaphoretic. No cyanosis or erythema.   Psychiatric: She has a normal mood and affect. Her speech is normal and behavior is  normal.   Nursing note and vitals reviewed.          Significant Labs:   Results for orders placed or performed during the hospital encounter of 01/22/18   CBC auto differential   Result Value Ref Range    WBC 13.20 (H) 3.90 - 12.70 K/uL    RBC 2.85 (L) 4.00 - 5.40 M/uL    Hemoglobin 9.5 (L) 12.0 - 16.0 g/dL    Hematocrit 29.6 (L) 37.0 - 48.5 %     (H) 82 - 98 fL    MCH 33.3 (H) 27.0 - 31.0 pg    MCHC 32.1 32.0 - 36.0 g/dL    RDW 18.7 (H) 11.5 - 14.5 %    Platelets 264 150 - 350 K/uL    MPV 8.7 (L) 9.2 - 12.9 fL    Gran # 11.6 (H) 1.8 - 7.7 K/uL    Lymph # 0.8 (L) 1.0 - 4.8 K/uL    Mono # 0.8 0.3 - 1.0 K/uL    Eos # 0.0 0.0 - 0.5 K/uL    Baso # 0.02 0.00 - 0.20 K/uL    Gran% 88.1 (H) 38.0 - 73.0 %    Lymph% 6.1 (L) 18.0 - 48.0 %    Mono% 5.9 4.0 - 15.0 %    Eosinophil% 0.1 0.0 - 8.0 %    Basophil% 0.2 0.0 - 1.9 %    Platelet Estimate Appears normal     Aniso Slight     Poik Slight     Hypo Occasional     Ovalocytes Occasional     Tear Drop Cells Occasional     Stomatocytes Present     Differential Method Automated    Comprehensive metabolic panel   Result Value Ref Range    Sodium 144 136 - 145 mmol/L    Potassium 3.6 3.5 - 5.1 mmol/L    Chloride 103 95 - 110 mmol/L    CO2 28 23 - 29 mmol/L    Glucose 124 (H) 70 - 110 mg/dL    BUN, Bld 19 8 - 23 mg/dL    Creatinine 0.7 0.5 - 1.4 mg/dL    Calcium 9.1 8.7 - 10.5 mg/dL    Total Protein 7.8 6.0 - 8.4 g/dL    Albumin 3.3 (L) 3.5 - 5.2 g/dL    Total Bilirubin 0.6 0.1 - 1.0 mg/dL    Alkaline Phosphatase 38 (L) 55 - 135 U/L    AST 17 10 - 40 U/L    ALT 9 (L) 10 - 44 U/L    Anion Gap 13 8 - 16 mmol/L    eGFR if African American >60 >60 mL/min/1.73 m^2    eGFR if non African American >60 >60 mL/min/1.73 m^2   Protime-INR   Result Value Ref Range    Prothrombin Time 13.7 (H) 9.0 - 12.5 sec    INR 1.3 (H) 0.8 - 1.2   APTT   Result Value Ref Range    aPTT 25.2 21.0 - 32.0 sec   Influenza antigen Nasopharyngeal Swab   Result Value Ref Range    Influenza A Ag, EIA Negative  Negative    Influenza B Ag, EIA Negative Negative    Flu A & B Source Nasopharyngeal Swab    Occult blood, other sources   Result Value Ref Range    Occult Blood Positive (A) Negative   CBC auto differential   Result Value Ref Range    WBC 12.54 3.90 - 12.70 K/uL    RBC 2.75 (L) 4.00 - 5.40 M/uL    Hemoglobin 9.2 (L) 12.0 - 16.0 g/dL    Hematocrit 28.7 (L) 37.0 - 48.5 %     (H) 82 - 98 fL    MCH 33.5 (H) 27.0 - 31.0 pg    MCHC 32.1 32.0 - 36.0 g/dL    RDW 18.7 (H) 11.5 - 14.5 %    Platelets 250 150 - 350 K/uL    MPV 8.6 (L) 9.2 - 12.9 fL    Gran # 11.2 (H) 1.8 - 7.7 K/uL    Lymph # 0.7 (L) 1.0 - 4.8 K/uL    Mono # 0.6 0.3 - 1.0 K/uL    Eos # 0.0 0.0 - 0.5 K/uL    Baso # 0.02 0.00 - 0.20 K/uL    Gran% 89.0 (H) 38.0 - 73.0 %    Lymph% 5.8 (L) 18.0 - 48.0 %    Mono% 4.9 4.0 - 15.0 %    Eosinophil% 0.1 0.0 - 8.0 %    Basophil% 0.2 0.0 - 1.9 %    Differential Method Automated    Type & Screen   Result Value Ref Range    Group & Rh O POS     Indirect Rainer NEG       All pertinent labs within the past 24 hours have been reviewed.    Significant Imaging:   Imaging Results          X-Ray Abdomen Flat And Erect (In process)                I have reviewed all pertinent imaging results/findings within the past 24 hours.     EKG: (personally reviewed)  Atrial fibrillation with RVR, no acute ST-T changes from previous tracings.

## 2018-01-23 NOTE — ED NOTES
The pt's son ( Jean [phone]) and daughter-in-law ( Kourtney [phone]) have left for the night.

## 2018-01-23 NOTE — HPI
Ms. Nevarez is an 89yo female with a PMHx of chronic A-Fib, HTN, CAD, chronic combined systolic/diastolic HFrEF of 40%, moderate TR, hypothyroidism, and iron deficiency anemia, who presented to the ED with c/o coffee ground emesis x 1 episode yesterday.  Associated nausea.  No aggravating or alleviating factors.  Denies any ABD pain or distention, diarrhea, constipation, rectal bleeding, melena/hematochezia, dysuria, hematuria, weakness, CP, palpitations, SOB, cough, lightheadedness/dizziness, syncope, HA, AMS, focal deficits, fever, or chills.  Patient's son reports she was seen in ED on 1/18/18 after an unwitnessed fall.  At that time, she was diagnosed with nasal bone fracture and UTI, and sent home on PO Cipro.  Initial work-up in ED resulted H&H 9.5/29.6, INR 1.3, influenza negative, hemoccult positive.  While in ED, patient noted to be tachycardic with HR as high as 140bpm.  EKG revealed A-Fib with RVR.  Patient remained hemodynamically stable and asymptomatic.  Diltiazem 20mg IV given, and patient started on diltiazem drip at 10mg/hr.  Hospital Medicine was consulted for admission.  Patient's has been off blood thinners for quite some time due to frequent falls. No further episodes of hematemesis since arrival to ED.

## 2018-01-23 NOTE — HPI
The patient presented to the ER for hematemesis. She is a nursing home resident and reportedly started vomiting yesterday evening. She is unable to provide a history. It is obtained from her son, medical staff and medical record. The patient reportedly complained of epigastric pain. She hasn't been eating well, but this change began months ago. There is no report of melena or hematochezia. Her Hgb in the ER was 9.5 which is the same it was five days ago when she came in after a fall. However, her Hgb in July was around 11. The current Hgb has remained stable overnight. BUN and creatinine are within normal range. She was started on IV Protonix. The family denies use of NSAIDs or blood thinners. She takes Protonix in the morning and Zantac in the evenings. Of note, she currently has Afib with RVR and [facility] med plans to start Cardizem.

## 2018-01-23 NOTE — ASSESSMENT & PLAN NOTE
- Clinically compensated currently.  - Will hold home diuretics given acute GI bleed.  - BP and HR control.  - Strict I&O's, daily weights.  - Monitor closely for volume overload.

## 2018-01-23 NOTE — PLAN OF CARE
SW met with patient and family members in ED.   Patient is a resident at [facility].  Family voiced no d/c concerns.  No anticipated needs at present. Case mgt to follow up with d/c needs.    Domitila Chaney (jennifer) [phone].  Jean Velazquez (son) [phone].  Chato Velazquez (son) [phone].     01/23/18 1247   Discharge Assessment   Assessment Type Discharge Planning Assessment   Confirmed/corrected address and phone number on facesheet? Yes   Assessment information obtained from? Caregiver   Current cognitive status: Alert/Oriented   Current Functional Status: Assistive Equipment;Needs Assistance;Partially Dependent   Lives With facility resident  ([facility])   Able to Return to Prior Arrangements yes   Is patient able to care for self after discharge? Unable to determine at this time (comments)   Who are your caregiver(s) and their phone number(s)? Jean Velazquez (son) [phone]    Patient's perception of discharge disposition nursing home   Readmission Within The Last 30 Days no previous admission in last 30 days   Patient currently being followed by outpatient case management? No   Patient currently receives any other outside agency services? No   Equipment Currently Used at Home walker, rolling;wheelchair   Do you have any problems affording any of your prescribed medications? No   Is the patient taking medications as prescribed? yes   Does the patient have transportation home? Yes   Transportation Available agency transportation   Does the patient receive services at the [facility]? No   Discharge Plan A Return to nursing home

## 2018-01-23 NOTE — ASSESSMENT & PLAN NOTE
- K+ WNL.  - Check Mg and TSH.  - Diltiazem drip initiated in ED.  - Resume home metoprolol tartrate 100mg BID and diltiazem CD 300mg daily.  - Monitor telemetry and wean diltiazem drip as tolerated.  - No AC due to acute GI bleed + frequent falls.

## 2018-01-23 NOTE — ED PROVIDER NOTES
SCRIBE #1 NOTE: I, Evgeny Del Angel, am scribing for, and in the presence of, Phillip Ayala MD. I have scribed the entire note.      History      Chief Complaint   Patient presents with    Emesis     coffee ground emesis. being treated for UTI       Review of patient's allergies indicates:   Allergen Reactions    Alendronate      abdominal pains    Amoxicillin-pot clavulanate Nausea And Vomiting    Codeine     Morphine      Difficulty breathing    Olmesartan      Nausea    Propoxyphene n-acetaminophen      Other reaction(s): Unable to obtain    Sulfa (sulfonamide antibiotics)      Stomach upset, Nausea    Zofran [ondansetron hcl (pf)]         HPI   HPI    1/22/2018, 10:13 PM   History obtained from the son and patient      History of Present Illness: Tika Nevarez is a 88 y.o. female patient who presents to the Emergency Department for an evaluation of coffee ground emesis which onset suddenly today. Pt is also currently on Cipro for a UTI. Symptoms are intermittent and moderate in severity. Exacerbated by nothing and relieved by nothing. Patient denies any fever, chills, nausea, diarrhea, abdominal pain, blood in stool, dysuria, hematuria, and all other sxs at this time. No further complaints or concerns at this time.         Arrival mode: EMS    PCP: Jackie Lomeli MD       Past Medical History:  Past Medical History:   Diagnosis Date    Anemia     Anxiety     Arthritis     Atrial fibrillation     Cervicalgia     follow[facility] PM&[facility] at [facility]    CHF (congestive heart failure)     Colitis     Compression fracture     Coronary artery disease     Depression     Frequent UTI     Hiatal hernia 7/12/2013    Hypertension     Hypothyroidism     Inguinal hernia     CT Abdomen/pelvis 10/29/2016---Left inguinal hernia containing loops of small bowel.    Lumbago     followed by PM&[facility] at [facility]    Moderate aortic regurgitation 12/8/2014    Stable and treated with  medications and followed by Cardiology     Non-rheumatic mitral regurgitation moderate-severe 9/16/2013    Osteoporosis 12/8/2014    Pacemaker     Peripheral vascular disease     Pneumonia     Pneumonia due to other specified bacteria(482.89)     Spinal cord disease     Suicide and self-inflicted injury by other specified means 1986 approx    took 's antianxiety  more of unintentinal OD. per patient    Thoracic or lumbosacral neuritis or radiculitis, unspecified     [facility]    Trouble in sleeping     Urinary incontinence        Past Surgical History:  Past Surgical History:   Procedure Laterality Date    APPENDECTOMY  1958    BACK SURGERY      BREAST SURGERY Right 1959 approx    Bx - benign    EYE SURGERY Bilateral 2014    cataract w/ IOL Lizette Ghosh eye MD    HYSTERECTOMY  1958    IZABELA for fibroids , risk for cancer and prolapse    JOINT REPLACEMENT Bilateral 2000 approx    knee    pacemaker with defibrillator Left     SPINE SURGERY      lumbar disc x 2         Family History:  Family History   Problem Relation Age of Onset    Heart disease Mother     Dementia Father     Heart disease Brother     Dementia Brother     Stroke Sister     Mental illness Paternal Aunt     Hypertension Sister     Heart disease Sister     Cancer Neg Hx     Mental retardation Neg Hx     Diabetes Neg Hx     COPD Neg Hx     Asthma Neg Hx     Alcohol abuse Neg Hx     Drug abuse Neg Hx        Social History:  Social History     Social History Main Topics    Smoking status: Never Smoker    Smokeless tobacco: Never Used    Alcohol use No    Drug use: No    Sexual activity: No       ROS   Review of Systems   Constitutional: Negative for chills and fever.   HENT: Negative for congestion and sore throat.    Respiratory: Negative for cough and shortness of breath.    Cardiovascular: Negative for chest pain.   Gastrointestinal: Negative for abdominal pain, blood in  stool, constipation and diarrhea.        (+) Coffee ground emesis   Genitourinary: Negative for dysuria and hematuria.   Musculoskeletal: Negative for back pain.   Skin: Negative for rash.   Neurological: Negative for weakness and headaches.   Hematological: Does not bruise/bleed easily.       Physical Exam      Initial Vitals [01/22/18 2203]   BP Pulse Resp Temp SpO2   134/80 99 18 100 °F (37.8 °C) 95 %      MAP       98          Physical Exam  Nursing Notes and Vital Signs Reviewed.  Constitutional: Patient is in no acute distress. Well-developed and well-nourished.  Head: Atraumatic. Normocephalic.  Eyes: PERRL. EOM intact. Conjunctivae are not pale. No scleral icterus.  ENT: Mucous membranes are moist. Oropharynx is clear and symmetric.    Neck: Supple. Full ROM. No lymphadenopathy.  Cardiovascular: Tachycardic. Irregularly irregular rhythm.   Pulmonary/Chest: No respiratory distress. Clear to auscultation bilaterally. No wheezing or rales.  Abdominal: Soft and non-distended.  There is no tenderness.  Musculoskeletal: Moves all extremities. No obvious deformities.  Skin: Warm and dry.  Neurological:  Alert, awake, and appropriate.  Normal speech.  No acute focal neurological deficits are appreciated.  Psychiatric: Normal affect. Good eye contact. Appropriate in content.    ED Course    Procedures  ED Vital Signs:  Vitals:    01/22/18 2203 01/22/18 2227 01/22/18 2302 01/23/18 0102   BP: 134/80 135/84 (!) 146/74 130/69   Pulse: 99 (!) 139 (!) 140 (!) 116   Resp: 18 (!) 29 (!) 29 (!) 29   Temp: 100 °F (37.8 °C)      TempSrc: Oral      SpO2: 95% (!) 93% (!) 92% 95%    01/23/18 0202 01/23/18 0217 01/23/18 0302   BP: (!) 147/67  129/68   Pulse: (!) 127  (!) 129   Resp: (!) 30  (!) 34   Temp:  99.3 °F (37.4 °C)    TempSrc:  Oral    SpO2: (!) 94%  96%       Abnormal Lab Results:  Labs Reviewed   CBC W/ AUTO DIFFERENTIAL - Abnormal; Notable for the following:        Result Value    WBC 13.20 (*)     RBC 2.85 (*)      Hemoglobin 9.5 (*)     Hematocrit 29.6 (*)      (*)     MCH 33.3 (*)     RDW 18.7 (*)     MPV 8.7 (*)     Gran # 11.6 (*)     Lymph # 0.8 (*)     Gran% 88.1 (*)     Lymph% 6.1 (*)     All other components within normal limits   COMPREHENSIVE METABOLIC PANEL - Abnormal; Notable for the following:     Glucose 124 (*)     Albumin 3.3 (*)     Alkaline Phosphatase 38 (*)     ALT 9 (*)     All other components within normal limits   PROTIME-INR - Abnormal; Notable for the following:     Prothrombin Time 13.7 (*)     INR 1.3 (*)     All other components within normal limits   OCCULT BLOOD, OTHER SOURCES - Abnormal; Notable for the following:     Occult Blood Positive (*)     All other components within normal limits   CBC W/ AUTO DIFFERENTIAL - Abnormal; Notable for the following:     RBC 2.75 (*)     Hemoglobin 9.2 (*)     Hematocrit 28.7 (*)      (*)     MCH 33.5 (*)     RDW 18.7 (*)     MPV 8.6 (*)     Gran # 11.2 (*)     Lymph # 0.7 (*)     Gran% 89.0 (*)     Lymph% 5.8 (*)     All other components within normal limits   APTT   INFLUENZA A AND B ANTIGEN   MAGNESIUM   TSH   MAGNESIUM   TSH   URINALYSIS   HEMOGLOBIN   HEMATOCRIT   CBC W/ AUTO DIFFERENTIAL   TYPE & SCREEN        All Lab Results:  Results for orders placed or performed during the hospital encounter of 01/22/18   CBC auto differential   Result Value Ref Range    WBC 13.20 (H) 3.90 - 12.70 K/uL    RBC 2.85 (L) 4.00 - 5.40 M/uL    Hemoglobin 9.5 (L) 12.0 - 16.0 g/dL    Hematocrit 29.6 (L) 37.0 - 48.5 %     (H) 82 - 98 fL    MCH 33.3 (H) 27.0 - 31.0 pg    MCHC 32.1 32.0 - 36.0 g/dL    RDW 18.7 (H) 11.5 - 14.5 %    Platelets 264 150 - 350 K/uL    MPV 8.7 (L) 9.2 - 12.9 fL    Gran # 11.6 (H) 1.8 - 7.7 K/uL    Lymph # 0.8 (L) 1.0 - 4.8 K/uL    Mono # 0.8 0.3 - 1.0 K/uL    Eos # 0.0 0.0 - 0.5 K/uL    Baso # 0.02 0.00 - 0.20 K/uL    Gran% 88.1 (H) 38.0 - 73.0 %    Lymph% 6.1 (L) 18.0 - 48.0 %    Mono% 5.9 4.0 - 15.0 %    Eosinophil% 0.1 0.0 -  8.0 %    Basophil% 0.2 0.0 - 1.9 %    Platelet Estimate Appears normal     Aniso Slight     Poik Slight     Hypo Occasional     Ovalocytes Occasional     Tear Drop Cells Occasional     Stomatocytes Present     Differential Method Automated    Comprehensive metabolic panel   Result Value Ref Range    Sodium 144 136 - 145 mmol/L    Potassium 3.6 3.5 - 5.1 mmol/L    Chloride 103 95 - 110 mmol/L    CO2 28 23 - 29 mmol/L    Glucose 124 (H) 70 - 110 mg/dL    BUN, Bld 19 8 - 23 mg/dL    Creatinine 0.7 0.5 - 1.4 mg/dL    Calcium 9.1 8.7 - 10.5 mg/dL    Total Protein 7.8 6.0 - 8.4 g/dL    Albumin 3.3 (L) 3.5 - 5.2 g/dL    Total Bilirubin 0.6 0.1 - 1.0 mg/dL    Alkaline Phosphatase 38 (L) 55 - 135 U/L    AST 17 10 - 40 U/L    ALT 9 (L) 10 - 44 U/L    Anion Gap 13 8 - 16 mmol/L    eGFR if African American >60 >60 mL/min/1.73 m^2    eGFR if non African American >60 >60 mL/min/1.73 m^2   Protime-INR   Result Value Ref Range    Prothrombin Time 13.7 (H) 9.0 - 12.5 sec    INR 1.3 (H) 0.8 - 1.2   APTT   Result Value Ref Range    aPTT 25.2 21.0 - 32.0 sec   Influenza antigen Nasopharyngeal Swab   Result Value Ref Range    Influenza A Ag, EIA Negative Negative    Influenza B Ag, EIA Negative Negative    Flu A & B Source Nasopharyngeal Swab    Occult blood, other sources   Result Value Ref Range    Occult Blood Positive (A) Negative   CBC auto differential   Result Value Ref Range    WBC 12.54 3.90 - 12.70 K/uL    RBC 2.75 (L) 4.00 - 5.40 M/uL    Hemoglobin 9.2 (L) 12.0 - 16.0 g/dL    Hematocrit 28.7 (L) 37.0 - 48.5 %     (H) 82 - 98 fL    MCH 33.5 (H) 27.0 - 31.0 pg    MCHC 32.1 32.0 - 36.0 g/dL    RDW 18.7 (H) 11.5 - 14.5 %    Platelets 250 150 - 350 K/uL    MPV 8.6 (L) 9.2 - 12.9 fL    Gran # 11.2 (H) 1.8 - 7.7 K/uL    Lymph # 0.7 (L) 1.0 - 4.8 K/uL    Mono # 0.6 0.3 - 1.0 K/uL    Eos # 0.0 0.0 - 0.5 K/uL    Baso # 0.02 0.00 - 0.20 K/uL    Gran% 89.0 (H) 38.0 - 73.0 %    Lymph% 5.8 (L) 18.0 - 48.0 %    Mono% 4.9 4.0 - 15.0 %     Eosinophil% 0.1 0.0 - 8.0 %    Basophil% 0.2 0.0 - 1.9 %    Differential Method Automated    Magnesium   Result Value Ref Range    Magnesium 1.9 1.6 - 2.6 mg/dL   TSH   Result Value Ref Range    TSH 0.878 0.400 - 4.000 uIU/mL   Type & Screen   Result Value Ref Range    Group & Rh O POS     Indirect Rainer NEG           The EKG was ordered, reviewed, and independently interpreted by the ED provider.  Interpretation time: 22:22  Rate: 119 BPM  Rhythm: Atrial fibrillation with rapid ventricular response  Interpretation: ST&T wave abnormality. No STEMI.        The Emergency Provider reviewed the vital signs and test results, which are outlined above.    ED Discussion     3:02 AM: Discussed case with Dr. Barkley ([facility]). Dr. Barkley agrees with current care and management of pt and accepts admission.   Admitting Service: [facility]   Admitting Physician: Dr. Barkley  Admit to: Telemetry    3:02 AM: Re-evaluated pt. I have discussed test results, shared treatment plan, and the need for admission with patient and family at bedside. Pt and family express understanding at this time and agree with all information. All questions answered. Pt and family have no further questions or concerns at this time. Pt is ready for admit.        ED Medication(s):  Medications   diltiaZEM 24 hr capsule 300 mg (0 mg Oral Hold 1/23/18 0330)   metoprolol tartrate (LOPRESSOR) tablet 100 mg (0 mg Oral Hold 1/23/18 0330)   promethazine (PHENERGAN) 6.25 mg in dextrose 5 % 50 mL IVPB (not administered)   diltiaZEM 125 mg in dextrose 5% 125 mL infusion (non-titrating) (10 mg/hr Intravenous New Bag 1/23/18 0525)   octreotide (SANDOSTATIN) 500 mcg in sodium chloride 0.9% 100 mL infusion (50 mcg/hr Intravenous New Bag 1/23/18 0525)   pantoprazole injection 40 mg (not administered)   diltiaZEM injection 20 mg (20 mg Intravenous Given 1/22/18 2331)   promethazine (PHENERGAN) 6.25 mg in dextrose 5 % 50 mL IVPB (0 mg Intravenous Stopped  1/23/18 0114)   pantoprazole injection 40 mg (40 mg Intravenous Given 1/23/18 0125)   diltiaZEM injection 20 mg (20 mg Intravenous Given 1/23/18 0304)       New Prescriptions    No medications on file             Medical Decision Making    Medical Decision Making:   Clinical Tests:   Lab Tests: Ordered and Reviewed  Medical Tests: Ordered and Reviewed           Scribe Attestation:   Scribe #1: I performed the above scribed service and the documentation accurately describes the services I performed. I attest to the accuracy of the note.    Attending:   Physician Attestation Statement for Scribe #1: I, Phillip Ayala MD, personally performed the services described in this documentation, as scribed by Evgeny Del Angel, in my presence, and it is both accurate and complete.          Clinical Impression       ICD-10-CM ICD-9-CM   1. Coffee ground vomiting K92.0 578.0   2. Weakness R53.1 780.79   3. Vomiting R11.10 787.03   4. Atrial fibrillation with RVR I48.91 427.31       Disposition:   Disposition: Admitted  Condition: Fair         Phillip Ayala MD  01/23/18 2404

## 2018-01-23 NOTE — ASSESSMENT & PLAN NOTE
- Patient with hematemesis and hemoccult positive.  - ABD x-ray pending.  - No further episodes since arrival to ED.  - Initial H&H 9.5/29.6, stable from H&H on 1/18.  - Monitor serial H&H, and transfuse as needed.  - Advance diet as tolerated   - Continue IV PPI.  - PRN antiemetics.  - Will start octreotide drip if hematemesis reoccurs.  - GI following, no plans for an EGD at this time

## 2018-01-24 PROBLEM — E46 MALNUTRITION: Status: ACTIVE | Noted: 2018-01-01

## 2018-01-24 NOTE — PLAN OF CARE
Problem: Patient Care Overview  Goal: Plan of Care Review  Patient is a-fib on the monitor. She is on a caridzem and octeotride drip. Patient lies in fetal position. She is very hard of hearing. Education and plan of care provided and explained. All alarms are on and audible, will continue to monitor.

## 2018-01-24 NOTE — NURSING
Patient transferred from ER. Oriented to self. AFIB on monitor. Octreotide and diltiazem per gtt. Family at bedside. Oxygen via nasal cannula. Coffee ground emesis. VSS. PIV intact. DNR order placed in chart and signed by MD. Plan of care reviewed with patient. Patient stable. . Patient free from falls fall precautions in place.Incontinent with brief. Call be;; and belongings with in reach reminded to call for assistance

## 2018-01-24 NOTE — SUBJECTIVE & OBJECTIVE
Subjective:     Interval History:   The patient had an uneventful night. No vomiting reported. No reported hematochezia, melena or hematemesis. Hgb is relatively stable. Patient remains in Afib.     Review of Systems   Constitutional:        See Interval History for daily ROS.      Objective:     Vital Signs (Most Recent):  Temp: 98.5 °F (36.9 °C) (01/24/18 0904)  Pulse: (!) 123 (01/24/18 0904)  Resp: 18 (01/24/18 0904)  BP: 126/77 (01/24/18 0904)  SpO2: (!) 93 % (01/24/18 0904) Vital Signs (24h Range):  Temp:  [97.2 °F (36.2 °C)-99.1 °F (37.3 °C)] 98.5 °F (36.9 °C)  Pulse:  [] 123  Resp:  [16-24] 18  SpO2:  [90 %-98 %] 93 %  BP: (107-146)/(60-95) 126/77     Weight: 43.9 kg (96 lb 12.5 oz) (01/24/18 1000)  Body mass index is 18.9 kg/m².      Intake/Output Summary (Last 24 hours) at 01/24/18 1148  Last data filed at 01/24/18 0600   Gross per 24 hour   Intake              314 ml   Output                0 ml   Net              314 ml       Lines/Drains/Airways     Pressure Ulcer                 Pressure Injury 06/05/15 2000 medial coccyx Stage I 963 days          Peripheral Intravenous Line                 Peripheral IV - Single Lumen 06/01/17 1202 Right Forearm 237 days         Peripheral IV - Single Lumen 07/26/17 1636 Left Forearm 181 days         Peripheral IV - Single Lumen 01/22/18 2235 Right Forearm 1 day         Peripheral IV - Single Lumen 01/22/18 2245 Right Antecubital 1 day                Physical Exam   Constitutional: She appears cachectic. She appears ill.   She is sleeping bent over in bed somewhat limiting her exam.    Cardiovascular: An irregularly irregular rhythm present.   Pulmonary/Chest: Effort normal and breath sounds normal. No respiratory distress.   Abdominal: Soft. She exhibits no distension. There is no tenderness.   Neurological:   Lethargic.        Significant Labs:  CBC:   Recent Labs  Lab 01/23/18  0213 01/23/18  0524  01/23/18  1803 01/24/18  0017 01/24/18  0540   WBC 12.54  13.02*  --   --   --  15.79*   HGB 9.2* 9.2*  9.2*  < > 9.2* 9.2* 8.9*  8.9*   HCT 28.7* 28.0*  28.0*  < > 29.5* 30.0* 28.3*  28.3*    277  --   --   --  271   < > = values in this interval not displayed.      Significant Imaging:  Imaging results within the past 24 hours have been reviewed.

## 2018-01-24 NOTE — PLAN OF CARE
Problem: Patient Care Overview  Goal: Plan of Care Review  Recommendation/Intervention: 1. SLP Evaluation (patient consumes modified consistency diet at home)      2. Advance diet when medically able to Cardiac with consistency per SLP.  Goals: Oral diet or nutrition support within 5 days  Nutrition Goal Status: new  Communication of RD Recs: reviewed with RN (Laney (attempted to contact SLP, not available))

## 2018-01-24 NOTE — CONSULTS
Patient is a resident at [facility]. Case Management following for return to NH.

## 2018-01-24 NOTE — ASSESSMENT & PLAN NOTE
Nutrition Diagnosis:  Malnutrition in the context of Social/Environmental Circumstances (moderate)    Related to (etiology):  decreased appetite and swallowing difficulty    Signs and Symptoms (as evidenced by):  Energy Intake: <75% of estimated energy requirement for 5 months  Body Fat Depletion: moderate depletion of thoracic and lumbar region   Muscle Mass Depletion: moderate depletion of temples, clavicle region and scapular region   Weight Loss: 7.9% in 5 months     Interventions/Recommendations (treatment strategy):  1. SLP evaluation for diet safety and consistency  2. If safe for oral diet add Boost Plus 1 can BID    Nutrition Diagnosis Status:  New

## 2018-01-24 NOTE — PLAN OF CARE
Problem: Patient Care Overview  Goal: Plan of Care Review  Outcome: Ongoing (interventions implemented as appropriate)  Patient awake, arouses to voice and oriented to self only. VS stable. Patient denies pain or SOB. Patient on telemetry, afib on the monitor. Patient on bedrest. Fall precautions in place. Bed alarm active and audible. Patient free from fall/injury. Plan of care reviewed with patient and family. Patient has no questions at this time. Will continue to monitor.

## 2018-01-24 NOTE — CONSULTS
[facility] -   Adult Nutrition  Consult Note    SUMMARY     Recommendations  Recommendation/Intervention: 1. SLP Evaluation (patient consumes modified consistency diet at home)      2. Advance diet when medically able to Cardiac with consistency per SLP.  Goals: Oral diet or nutrition support within 5 days  Nutrition Goal Status: new  Communication of RD Recs: reviewed with RN (Laney (attempted to contact SLP, not available))    Reason for Assessment  Reason for Assessment: identified at risk by screening criteria (MST 2, food chopped at home)      1. Coffee ground vomiting    2. Weakness    3. Vomiting    4. Atrial fibrillation with RVR    5. Hematemesis without nausea    6. Acute blood loss anemia      Past Medical History:   Diagnosis Date    Anemia     Anxiety     Arthritis     Atrial fibrillation     Cervicalgia     followed [facility] PM&[facility] at [facility]    CHF (congestive heart failure)     Colitis     Compression fracture     Coronary artery disease     Depression     Frequent UTI     Hiatal hernia 7/12/2013    Hypertension     Hypothyroidism     Inguinal hernia     CT Abdomen/pelvis 10/29/2016---Left inguinal hernia containing loops of small bowel.    Lumbago     followed by PM&R at [facility]    Moderate aortic regurgitation 12/8/2014    Stable and treated with medications and followed by Cardiology     Non-rheumatic mitral regurgitation moderate-severe 9/16/2013    Osteoporosis 12/8/2014    Pacemaker     Peripheral vascular disease     Pneumonia     Pneumonia due to other specified bacteria(482.89)     Spinal cord disease     Suicide and self-inflicted injury by other specified means 1986 approx    took 's antianxiety  more of unintentinal OD. per patient    Thoracic or lumbosacral neuritis or radiculitis, unspecified     followed by PM&R at [facility]    Trouble in sleeping     Urinary incontinence        Interdisciplinary  Rounds: attended (discussed pt with RN Laney)    Nutrition Discharge Planning: Nursing home on oral diet if approrpiate with concistency per SLP    Nutrition Prescription Ordered  Current Diet Order: NPO  % Intake of Estimated Energy Needs: 0 - 25 %  % Meal Intake: NPO     Nutrition Risk Screen  Nutrition Risk Screen: no indicators present, other (see comments) (food choppped)    Nutrition/Diet History  Typical Food/Fluid Intake: Family at bedside state patient has had recently decreased oral itnake and weight loss at nursing home  Food Preferences: no Mandaen or cultural food preferences noted    Labs/Tests/Procedures/Meds  Pertinent Labs Reviewed: reviewed    Lab Results   Component Value Date     2018    K 3.9 2018     2018    CO2 30 (H) 2018    BUN 18 2018    CREATININE 0.7 2018    CALCIUM 8.9 2018    ANIONGAP 10 2018    ESTGFRAFRICA >60 2018    EGFRNONAA >60 2018     Lab Results   Component Value Date    CALCIUM 8.9 2018    PHOS 2.9 2015     Pertinent Medications Reviewed: reviewed    Physical Findings  Oral/Mouth Cavity:  (WDL)  Skin:  (Eddie 15)    Anthropometrics  Height: 5' (152.4 cm)  Weight Method: Bed Scale  Weight: 43.9 kg (96 lb 12.5 oz)     Ideal Body Weight (IBW), Female: 100 lb     % Ideal Body Weight, Female (lb): 96.78 lb  BMI (Calculated): 18.9  BMI Grade: 18.5-24.9 - normal  Weight Loss: unintentional (8% in 5 months)  Usual Body Weight (UBW), k.7 kg (5 months ago per EPIC records)     % Usual Body Weight: 92.23  % Weight Change From Usual Weight: -7.97 %    Estimated/Assessed Needs  Weight Used For Calorie Calculations: 43.9 kg (96 lb 12.5 oz)   Energy Calorie Requirements (kcal): 1300  Energy Need Method: Kcal/kg  30 kcal/kg (kcal): 1317   RMR ([place]-St. Jeor Equation): 790.5  Weight Used For Protein Calculations: 43.9 kg (96 lb 12.5 oz)  1.0 gm Protein (gm): 43.99 and 1.2 gm Protein (gm): 52.79  RDA  Method (mL): 1300    CHO Requirement: 163g (50% EEN)     Assessment and Plan    Malnutrition    Nutrition Diagnosis:  Malnutrition in the context of Social/Environmental Circumstances (moderate)    Related to (etiology):  decreased appetite and swallowing difficulty    Signs and Symptoms (as evidenced by):  Energy Intake: <75% of estimated energy requirement for 5 months  Body Fat Depletion: moderate depletion of thoracic and lumbar region   Muscle Mass Depletion: moderate depletion of temples, clavicle region and scapular region   Weight Loss: 7.9% in 5 months     Interventions/Recommendations (treatment strategy):  1. SLP evaluation for diet safety and consistency  2. If safe for oral diet add Boost Plus 1 can BID    Nutrition Diagnosis Status:  New            Monitor and Evaluation  Food and Nutrient Intake: food and beverage intake  Food and Nutrient Adminstration: diet order  Physical Activity and Function: nutrition-related ADLs and IADLs  Biochemical Data, Medical Tests and Procedures: electrolyte and renal panel, glucose/endocrine profile  Nutrition-Focused Physical Findings: overall appearance    Nutrition Follow-Up  RD Follow-up?: Yes (1-2x weekly)

## 2018-01-25 VITALS
WEIGHT: 96.81 LBS | RESPIRATION RATE: 18 BRPM | TEMPERATURE: 98 F | HEIGHT: 60 IN | OXYGEN SATURATION: 93 % | BODY MASS INDEX: 19.01 KG/M2 | DIASTOLIC BLOOD PRESSURE: 68 MMHG | SYSTOLIC BLOOD PRESSURE: 120 MMHG | HEART RATE: 80 BPM

## 2018-01-25 NOTE — PLAN OF CARE
Patient laying in bed awake disoriented to self, arouses to voice. No complaints of pain at this time. afib on tele monitor. Diltiazem drip 10ml/hr infusing. Patient receives IV Abt therapy .Fall risk precaution maintained. Bed in the lowest position with bed alarm on. Call light within reach. Will continue to monitor.

## 2018-01-25 NOTE — PLAN OF CARE
Patient laying in bed unresponsive unable to obtain a pulse,respiration or blood pressure.  1100 charge nurse notified  2307 notified Dr. Barkley  2310 house supervisor notified on patient status  2325 patient pronounced dead by Dr. Barkley  2332 Son Heron Velazquez called  2356 Family at bedside

## 2018-01-25 NOTE — SUBJECTIVE & OBJECTIVE
Interval History: No further episodes of coffee ground emesis today, H/H is stable. GI following, recommending starting clear liquid diet. No plan for EGD at this time.     Review of Systems   Constitutional: Negative for activity change, appetite change, chills, diaphoresis, fatigue, fever and unexpected weight change.   HENT: Negative for congestion, drooling, facial swelling, postnasal drip, rhinorrhea, sinus pressure, sneezing, sore throat and trouble swallowing.    Eyes: Negative for photophobia, discharge, redness, itching and visual disturbance.   Respiratory: Negative for apnea, cough, choking, chest tightness, shortness of breath, wheezing and stridor.    Cardiovascular: Negative for chest pain, palpitations and leg swelling.   Gastrointestinal: Positive for nausea and vomiting. Negative for abdominal distention, abdominal pain, anal bleeding, blood in stool, constipation, diarrhea and rectal pain.        Coffee ground emesis   Endocrine: Negative for polydipsia, polyphagia and polyuria.   Genitourinary: Negative for decreased urine volume, difficulty urinating, dysuria, enuresis, flank pain, frequency, hematuria, pelvic pain, urgency, vaginal bleeding and vaginal discharge.   Musculoskeletal: Positive for back pain (chronic). Negative for arthralgias, gait problem, joint swelling, myalgias, neck pain and neck stiffness.   Skin: Negative for color change, pallor, rash and wound.   Neurological: Negative for dizziness, seizures, syncope, facial asymmetry, speech difficulty, weakness, light-headedness, numbness and headaches.   Psychiatric/Behavioral: Negative for agitation, confusion, hallucinations, sleep disturbance and suicidal ideas. The patient is not nervous/anxious.    All other systems reviewed and are negative.    Objective:     Vital Signs (Most Recent):  Temp: 98.7 °F (37.1 °C) (01/24/18 1547)  Pulse: (!) 138 (01/24/18 1547)  Resp: 18 (01/24/18 1547)  BP: 115/76 (01/24/18 1547)  SpO2: (!) 93 %  (01/24/18 1547) Vital Signs (24h Range):  Temp:  [97.2 °F (36.2 °C)-98.7 °F (37.1 °C)] 98.7 °F (37.1 °C)  Pulse:  [] 138  Resp:  [18-24] 18  SpO2:  [91 %-95 %] 93 %  BP: (107-134)/(69-93) 115/76     Weight: 43.9 kg (96 lb 12.5 oz)  Body mass index is 18.9 kg/m².    Intake/Output Summary (Last 24 hours) at 01/24/18 1812  Last data filed at 01/24/18 0600   Gross per 24 hour   Intake              314 ml   Output                0 ml   Net              314 ml      Physical Exam   Constitutional: She is oriented to person, place, and time. She appears well-developed and well-nourished. No distress.   HENT:   Head: Normocephalic and atraumatic.   Eyes: Conjunctivae are normal.   Neck: Normal range of motion. Neck supple. No JVD present.   Cardiovascular: Intact distal pulses.  An irregularly irregular rhythm present. Tachycardia present.    Murmur heard.   Systolic murmur is present with a grade of 2/6   Pulses:       Radial pulses are 2+ on the right side, and 2+ on the left side.        Dorsalis pedis pulses are 2+ on the right side, and 2+ on the left side.        Posterior tibial pulses are 2+ on the right side, and 2+ on the left side.   Normal S1, variable S2.   Pulmonary/Chest: Effort normal and breath sounds normal. No accessory muscle usage. No tachypnea. No respiratory distress. She has no wheezes. She has no rhonchi. She has no rales.   Abdominal: Soft. Bowel sounds are normal. She exhibits no distension. There is no tenderness. There is no rebound, no guarding and no CVA tenderness.   Musculoskeletal: Normal range of motion. She exhibits no edema, tenderness or deformity.   Neurological: She is alert and oriented to person, place, and time. No cranial nerve deficit or sensory deficit.   No focal deficits.   Skin: Skin is warm, dry and intact. Capillary refill takes less than 2 seconds. No rash noted. She is not diaphoretic. No cyanosis or erythema.   Psychiatric: She has a normal mood and affect. Her  speech is normal and behavior is normal.   Nursing note and vitals reviewed.      Significant Labs: All pertinent labs within the past 24 hours have been reviewed.    Significant Imaging:   Imaging Results          X-Ray Abdomen Flat And Erect (Final result)  Result time 01/23/18 08:55:34    Final result by KELLEN Hills Sr., MD (01/23/18 08:55:34)                 Impression:      1. There is a large hiatal hernia.   2. The bowel gas pattern is not well-seen. There is no evidence of pneumoperitoneum on the erect image. If additional imaging evaluation is clinically indicated, I recommend consideration of a CT examination of the abdomen and pelvis with oral and IV contrast.  3. There is a moderate amount of levoconvex curvature of the thoracolumbar spine. There are mild degenerative changes in the thoracic and lumbar spine.    4. There are suspected compression fractures in the inferior aspect of the thoracic spine. There are age-indeterminate fractures on the right side of the thoracic cage.   5. There is haziness in the base of the right hemithorax. If additional imaging evaluation is clinically indicated, I recommend consideration of a CT examination of the thorax with IV contrast.      Electronically signed by: KELLEN HILLS MD  Date:     01/23/18  Time:    08:55              Narrative:    Flat and erect KUB    History: Vomiting, unspecified    Finding: There is a large hiatal hernia. The bowel gas pattern is not well-seen. There is no evidence of pneumoperitoneum on the erect image. There is a moderate amount of levoconvex curvature of the thoracolumbar spine. There are mild degenerative changes in the thoracic and lumbar spine. There are suspected compression fractures in the inferior aspect of the thoracic spine. There is haziness in the base of the right hemithorax. There are age-indeterminate fractures on the right side of the thoracic cage. There is a mild amount of atherosclerosis.

## 2018-01-25 NOTE — HOSPITAL COURSE
1/24/18 No further episodes of coffee ground emesis today, H/H is stable. GI following, recommending starting clear liquid diet. No plan for EGD at this time. 1/24/18  The patient was pronounced dead at 2325 (11:25 PM) on January 24, 2018.

## 2018-01-25 NOTE — SIGNIFICANT EVENT
DEATH NOTE    Called by nursing staff to pronounce patient death. Upon entering room, patient is without any visible respiratory movements, no heart sounds heard, no carotid or radial pulses palpable. Monitor shows asystole. The patient is pronounced dead at 2325 (11:25 PM) on January 24, 2018.    - Francisco J Barkley MD

## 2018-01-25 NOTE — PROGRESS NOTES
[facility] Hospital Medicine  Progress Note    Patient Name: Tika Nevarez  MRN: [id number]  Patient Class: IP- Inpatient   Admission Date: 1/22/2018  Length of Stay: 1 days  Attending Physician: Sushil Kenny MD  Primary Care Provider: Jackie Lomeli MD        Subjective:     Principal Problem:Acute blood loss anemia    HPI:  Ms. Nevarez is an 89yo female  with a PMHx of chronic A-Fib, HTN, CAD, chronic combined systolic/diastolic HFrEF of 40%, moderate TR, hypothyroidism, and iron deficiency anemia, who presented to the ED with c/o coffee ground emesis x 1 episode yesterday.  Associated nausea.  No aggravating or alleviating factors.  Denies any ABD pain or distention, diarrhea, constipation, rectal bleeding, melena/hematochezia, dysuria, hematuria, weakness, CP, palpitations, SOB, cough, lightheadedness/dizziness, syncope, HA, AMS, focal deficits, fever, or chills.  Patient's son reports she was seen in ED on 1/18/18 after an unwitnessed fall.  At that time, she was diagnosed with nasal bone fracture and UTI, and sent home on PO Cipro.  Initial work-up in ED resulted H&H 9.5/29.6, INR 1.3, influenza negative, hemoccult positive.  While in ED, patient noted to be tachycardic with HR as high as 140bpm.  EKG revealed A-Fib with RVR.  Patient remained hemodynamically stable and asymptomatic.  Diltiazem 20mg IV given, and patient started on diltiazem drip at 10mg/hr.  Hospital Medicine was consulted for admission.  Patient's has been off blood thinners for quite some time due to frequent falls. No further episodes of hematemesis since arrival to ED.    Hospital Course:  1/24/18 No further episodes of coffee ground emesis today, H/H is stable. GI following, recommending starting clear liquid diet. No plan for EGD at this time.     Interval History: No further episodes of coffee ground emesis today, H/H is stable. GI following, recommending starting clear liquid diet. No plan for EGD at this  time.     Review of Systems   Constitutional: Negative for activity change, appetite change, chills, diaphoresis, fatigue, fever and unexpected weight change.   HENT: Negative for congestion, drooling, facial swelling, postnasal drip, rhinorrhea, sinus pressure, sneezing, sore throat and trouble swallowing.    Eyes: Negative for photophobia, discharge, redness, itching and visual disturbance.   Respiratory: Negative for apnea, cough, choking, chest tightness, shortness of breath, wheezing and stridor.    Cardiovascular: Negative for chest pain, palpitations and leg swelling.   Gastrointestinal: Positive for nausea and vomiting. Negative for abdominal distention, abdominal pain, anal bleeding, blood in stool, constipation, diarrhea and rectal pain.        Coffee ground emesis   Endocrine: Negative for polydipsia, polyphagia and polyuria.   Genitourinary: Negative for decreased urine volume, difficulty urinating, dysuria, enuresis, flank pain, frequency, hematuria, pelvic pain, urgency, vaginal bleeding and vaginal discharge.   Musculoskeletal: Positive for back pain (chronic). Negative for arthralgias, gait problem, joint swelling, myalgias, neck pain and neck stiffness.   Skin: Negative for color change, pallor, rash and wound.   Neurological: Negative for dizziness, seizures, syncope, facial asymmetry, speech difficulty, weakness, light-headedness, numbness and headaches.   Psychiatric/Behavioral: Negative for agitation, confusion, hallucinations, sleep disturbance and suicidal ideas. The patient is not nervous/anxious.    All other systems reviewed and are negative.    Objective:     Vital Signs (Most Recent):  Temp: 98.7 °F (37.1 °C) (01/24/18 1547)  Pulse: (!) 138 (01/24/18 1547)  Resp: 18 (01/24/18 1547)  BP: 115/76 (01/24/18 1547)  SpO2: (!) 93 % (01/24/18 1547) Vital Signs (24h Range):  Temp:  [97.2 °F (36.2 °C)-98.7 °F (37.1 °C)] 98.7 °F (37.1 °C)  Pulse:  [] 138  Resp:  [18-24] 18  SpO2:  [91 %-95 %]  93 %  BP: (107-134)/(69-93) 115/76     Weight: 43.9 kg (96 lb 12.5 oz)  Body mass index is 18.9 kg/m².    Intake/Output Summary (Last 24 hours) at 01/24/18 1812  Last data filed at 01/24/18 0600   Gross per 24 hour   Intake              314 ml   Output                0 ml   Net              314 ml      Physical Exam   Constitutional: She is oriented to person, place, and time. She appears well-developed and well-nourished. No distress.   HENT:   Head: Normocephalic and atraumatic.   Eyes: Conjunctivae are normal.   Neck: Normal range of motion. Neck supple. No JVD present.   Cardiovascular: Intact distal pulses.  An irregularly irregular rhythm present. Tachycardia present.    Murmur heard.   Systolic murmur is present with a grade of 2/6   Pulses:       Radial pulses are 2+ on the right side, and 2+ on the left side.        Dorsalis pedis pulses are 2+ on the right side, and 2+ on the left side.        Posterior tibial pulses are 2+ on the right side, and 2+ on the left side.   Normal S1, variable S2.   Pulmonary/Chest: Effort normal and breath sounds normal. No accessory muscle usage. No tachypnea. No respiratory distress. She has no wheezes. She has no rhonchi. She has no rales.   Abdominal: Soft. Bowel sounds are normal. She exhibits no distension. There is no tenderness. There is no rebound, no guarding and no CVA tenderness.   Musculoskeletal: Normal range of motion. She exhibits no edema, tenderness or deformity.   Neurological: She is alert and oriented to person, place, and time. No cranial nerve deficit or sensory deficit.   No focal deficits.   Skin: Skin is warm, dry and intact. Capillary refill takes less than 2 seconds. No rash noted. She is not diaphoretic. No cyanosis or erythema.   Psychiatric: She has a normal mood and affect. Her speech is normal and behavior is normal.   Nursing note and vitals reviewed.      Significant Labs: All pertinent labs within the past 24 hours have been  reviewed.    Significant Imaging:   Imaging Results          X-Ray Abdomen Flat And Erect (Final result)  Result time 01/23/18 08:55:34    Final result by KELLEN Hills Sr., MD (01/23/18 08:55:34)                 Impression:      1. There is a large hiatal hernia.   2. The bowel gas pattern is not well-seen. There is no evidence of pneumoperitoneum on the erect image. If additional imaging evaluation is clinically indicated, I recommend consideration of a CT examination of the abdomen and pelvis with oral and IV contrast.  3. There is a moderate amount of levoconvex curvature of the thoracolumbar spine. There are mild degenerative changes in the thoracic and lumbar spine.    4. There are suspected compression fractures in the inferior aspect of the thoracic spine. There are age-indeterminate fractures on the right side of the thoracic cage.   5. There is haziness in the base of the right hemithorax. If additional imaging evaluation is clinically indicated, I recommend consideration of a CT examination of the thorax with IV contrast.      Electronically signed by: KELLEN HILLS MD  Date:     01/23/18  Time:    08:55              Narrative:    Flat and erect KUB    History: Vomiting, unspecified    Finding: There is a large hiatal hernia. The bowel gas pattern is not well-seen. There is no evidence of pneumoperitoneum on the erect image. There is a moderate amount of levoconvex curvature of the thoracolumbar spine. There are mild degenerative changes in the thoracic and lumbar spine. There are suspected compression fractures in the inferior aspect of the thoracic spine. There is haziness in the base of the right hemithorax. There are age-indeterminate fractures on the right side of the thoracic cage. There is a mild amount of atherosclerosis.                                 Assessment/Plan:      * Acute blood loss anemia secondary to acute GI bleed    - Patient with hematemesis and hemoccult positive.  - ABD x-ray  pending.  - No further episodes since arrival to ED.  - Initial H&H 9.5/29.6, stable from H&H on 1/18.  - Monitor serial H&H, and transfuse as needed.  - Advance diet as tolerated   - Continue IV PPI.  - PRN antiemetics.  - Will start octreotide drip if hematemesis reoccurs.  - GI following, no plans for an EGD at this time         Coffee ground vomiting    See above        Hypothyroidism    - Continue home Synthroid.  - Check TSH.        Atrial fibrillation with RVR    - Remains hemodynamically stable and asymptomatic.   - K+ WNL.  - Check Mg and TSH.  - Diltiazem drip initiated in ED.  - Resume home metoprolol tartrate 100mg BID and diltiazem CD 300mg daily.  - Monitor telemetry and wean diltiazem drip as tolerated.  - No AC due to acute GI bleed + frequent falls.        Essential hypertension    - BP stable.  - Continue home beta blocker and CCB.  - Monitor BP trends.        Chronic combined systolic and diastolic congestive heart failure    - Clinically compensated currently.  - Will hold home diuretics given acute GI bleed.  - BP and HR control.  - Strict I&O's, daily weights.  - Monitor closely for volume overload.          VTE Risk Mitigation         Ordered     Medium Risk of VTE  Once      01/23/18 1054     Reason for No Pharmacological VTE Prophylaxis  Once      01/23/18 1054     Place sequential compression device  Until discontinued      01/23/18 1054              Nicola Dick NP  Department of Hospital Medicine   [facility]

## 2018-02-03 NOTE — DISCHARGE SUMMARY
[facility] Hospital Medicine  Discharge Summary      Patient Name: Tika Nevarez  MRN: [id number]  Admission Date: 1/22/2018  Hospital Length of Stay: 1 days  Discharge Date and Time:      Attending Physician: No att. providers found   Discharging Provider: Nicola Dick NP  Primary Care Provider: Jackie Lomeli MD      HPI:   Ms. Nevarez is an 89yo female  with a PMHx of chronic A-Fib, HTN, CAD, chronic combined systolic/diastolic HFrEF of 40%, moderate TR, hypothyroidism, and iron deficiency anemia, who presented to the ED with c/o coffee ground emesis x 1 episode yesterday.  Associated nausea.  No aggravating or alleviating factors.  Denies any ABD pain or distention, diarrhea, constipation, rectal bleeding, melena/hematochezia, dysuria, hematuria, weakness, CP, palpitations, SOB, cough, lightheadedness/dizziness, syncope, HA, AMS, focal deficits, fever, or chills.  Patient's son reports she was seen in ED on 1/18/18 after an unwitnessed fall.  At that time, she was diagnosed with nasal bone fracture and UTI, and sent home on PO Cipro.  Initial work-up in ED resulted H&H 9.5/29.6, INR 1.3, influenza negative, hemoccult positive.  While in ED, patient noted to be tachycardic with HR as high as 140bpm.  EKG revealed A-Fib with RVR.  Patient remained hemodynamically stable and asymptomatic.  Diltiazem 20mg IV given, and patient started on diltiazem drip at 10mg/hr.  Hospital Medicine was consulted for admission.  Patient's has been off blood thinners for quite some time due to frequent falls. No further episodes of hematemesis since arrival to ED.    * No surgery found *      Hospital Course:   1/24/18 No further episodes of coffee ground emesis today, H/H is stable. GI following, recommending starting clear liquid diet. No plan for EGD at this time. 1/24/18  The patient was pronounced dead at 2325 (11:25 PM) on January 24, 2018.        Consults:   Consults         Status Ordering Provider      Inpatient consult to Gastroenterology  Once     Provider:  Kings Foley III, MD    Completed SRINIVASA MCKENZIE     Inpatient consult to Social Work  Once     Provider:  (Not yet assigned)    Completed JAKE DANIELS     IP consult to case management  Once     Provider:  (Not yet assigned)    Completed JAKE DANIELS          No new Assessment & Plan notes have been filed under this hospital service since the last note was generated.  Service: Hospital Medicine    Final Active Diagnoses:    Diagnosis Date Noted POA    PRINCIPAL PROBLEM:  Acute blood loss anemia secondary to acute GI bleed [D62] 2018 Yes    Malnutrition [E46] 2018 Yes    Atrial fibrillation with RVR [I48.91] 2018 Yes    Hypothyroidism [E03.9] 2018 Yes     Chronic    Hematemesis without nausea [K92.0] 2018 Unknown    Coffee ground vomiting [K92.0] 2018 Yes    Essential hypertension [I10] 03/10/2014 Yes     Chronic    Chronic combined systolic and diastolic congestive heart failure [I50.42] 2013 Yes     Chronic      Problems Resolved During this Admission:    Diagnosis Date Noted Date Resolved POA       Discharged Condition:     Disposition:     Follow Up:    Patient Instructions:   No discharge procedures on file.    Significant Diagnostic Studies:   Imaging Results          X-Ray Abdomen Flat And Erect (Final result)  Result time 18 08:55:34    Final result by KELLEN Hills Sr., MD (18 08:55:34)                 Impression:      1. There is a large hiatal hernia.   2. The bowel gas pattern is not well-seen. There is no evidence of pneumoperitoneum on the erect image. If additional imaging evaluation is clinically indicated, I recommend consideration of a CT examination of the abdomen and pelvis with oral and IV contrast.  3. There is a moderate amount of levoconvex curvature of the thoracolumbar spine. There are mild degenerative changes in the thoracic and lumbar  spine.    4. There are suspected compression fractures in the inferior aspect of the thoracic spine. There are age-indeterminate fractures on the right side of the thoracic cage.   5. There is haziness in the base of the right hemithorax. If additional imaging evaluation is clinically indicated, I recommend consideration of a CT examination of the thorax with IV contrast.      Electronically signed by: KELLEN TOTH MD  Date:     01/23/18  Time:    08:55              Narrative:    Flat and erect KUB    History: Vomiting, unspecified    Finding: There is a large hiatal hernia. The bowel gas pattern is not well-seen. There is no evidence of pneumoperitoneum on the erect image. There is a moderate amount of levoconvex curvature of the thoracolumbar spine. There are mild degenerative changes in the thoracic and lumbar spine. There are suspected compression fractures in the inferior aspect of the thoracic spine. There is haziness in the base of the right hemithorax. There are age-indeterminate fractures on the right side of the thoracic cage. There is a mild amount of atherosclerosis.                                 Pending Diagnostic Studies:     None         Medications:  Reconciled Home Medications:   Discharge Medication List as of 1/25/2018  8:25 AM      CONTINUE these medications which have NOT CHANGED    Details   ascorbic acid, vitamin C, (VITAMIN C) 500 MG tablet Take 500 mg by mouth once daily., Historical Med      cetirizine (ZYRTEC) 10 MG tablet Take 10 mg by mouth once daily., Until Discontinued, Historical Med      ciprofloxacin HCl (CIPRO) 250 MG tablet Take 1 tablet (250 mg total) by mouth 2 (two) times daily., Starting Thu 1/18/2018, Until u 1/25/2018, Print      clotrimazole-betamethasone 1-0.05% (LOTRISONE) cream Apply topically 2 (two) times daily., Starting 12/21/2015, Until Discontinued, Normal      diltiaZEM (CARDIZEM CD) 300 MG 24 hr capsule TAKE 1 CAPSULE (300 MG TOTAL) BY MOUTH ONCE DAILY.,  Normal      docusate sodium (COLACE) 50 MG capsule Take by mouth 2 (two) times daily as needed. , Historical Med      duloxetine (CYMBALTA) 30 MG capsule Take 30 mg by mouth 2 (two) times daily. , Starting Mon 1/4/2016, Historical Med      fluticasone (FLONASE) 50 mcg/actuation nasal spray 1 spray by Each Nare route once daily., Starting 10/3/2016, Until Discontinued, Normal      furosemide (LASIX) 20 MG tablet Take 1 tablet (20 mg total) by mouth once daily., Starting 3/16/2017, Until Discontinued, Normal      hydrocodone-acetaminophen 5-325mg (NORCO) 5-325 mg per tablet Take 1 tablet by mouth 3 (three) times daily. , Starting Sat 8/15/2015, Historical Med      lactulose (CHRONULAC) 10 gram/15 mL solution Take by mouth as needed., Until Discontinued, Historical Med      levothyroxine (SYNTHROID) 75 MCG tablet TAKE 1 TABLET (75 MCG TOTAL) BY MOUTH ONCE DAILY., Starting Mon 8/28/2017, Normal      megestrol (MEGACE) 400 mg/10 mL (40 mg/mL) Susp Starting Fri 11/24/2017, Historical Med      metoprolol tartrate (LOPRESSOR) 100 MG tablet TAKE 1 TABLET (100 MG TOTAL) BY MOUTH 2 (TWO) TIMES DAILY., Normal      montelukast (SINGULAIR) 10 mg tablet Take 1 tablet (10 mg total) by mouth once daily., Starting 6/4/2016, Until Discontinued, Normal      oseltamivir (TAMIFLU) 75 MG capsule Take 75 mg by mouth., Historical Med      pantoprazole (PROTONIX) 40 MG tablet TAKE 1 TABLET BY MOUTH EVERY DAY 30 MINUTES PRIOR TO BREAKFAST, Normal      polyethylene glycol (GLYCOLAX) 17 gram PwPk Take 17 g by mouth continuous prn., Until Discontinued, Historical Med      ranitidine (ZANTAC) 300 MG tablet TAKE 1 TABLET BY MOUTH EVERY DAY, Normal      vitamin D 1000 units Tab Take 185 mg by mouth once daily., Until Discontinued, Historical Med      zinc sulfate (ZINCATE) 220 (50) mg capsule Take 220 mg by mouth once daily., Historical Med             Indwelling Lines/Drains at time of discharge:   Lines/Drains/Airways     Pressure Ulcer                  Pressure Injury 06/05/15 2000 medial coccyx Stage I 973 days                Time spent on the discharge of patient: > 30 minutes  Patient was seen and examined on the date of discharge and determined to be suitable for discharge.         Nicola Dick NP  Department of Hospital Medicine  [facility]

## 2018-02-05 NOTE — PHYSICIAN QUERY
PT Name: Tika Nevarez  MR #: [id number]    Physician Query Form - Nutrition Clarification     CDS/: Nelly Chaney RN           Contact information: [phone]  This form is a permanent document in the medical record.     Query Date: February 5, 2018    By submitting this query, we are merely seeking further clarification of documentation.. Please utilize your independent clinical judgment when addressing the question(s) below.    The Medical record contains the following:   Indicators  Supporting Clinical Findings Location in Medical Record   x % of Estimated Energy Intake over a time frame from p.o., TF, or TPN % Intake of Estimated Energy Needs: 0 - 25 %  % Meal Intake: NPO  Nutrition consult 1/24   x Weight Status over a time frame % Weight Change From Usual Weight: -7.97 %    Weight Loss: 7.9% in 5 months  Nutrition consult 1/24   x Subcutaneous Fat and/or Muscle LossNutrition consult 1/24 Body Fat Depletion: moderate depletion of thoracic and lumbar region   Muscle Mass Depletion: moderate depletion of temples, clavicle region and scapular region  Nutrition consult 1/24    Fluid Accumulation or Edema      Reduced  Strength     x Wt / BMI / Usual Body Weight Wt 43.9 kg  BMI 18.9 Nutrition consult 1/24    Delayed Wound Healing / Failure to Thrive     x Acute or Chronic Illness                           coffe ground vomitting, weakness, acute blood loss anemia,  Nutrition consult 1/24    Medication     x Treatment Nutrition consult for recommendations Nutrition consult 1/24   x Other Malnutrition  DC Summary 1/24     AND / ASPEN Clinical Characteristics (October 2011)  A minimum of two characteristics is recommended for diagnosing either moderate or severe malnutrition   Mild Malnutrition Moderate Malnutrition Severe Malnutrition   Energy Intake from p.o., TF or TPN. < 75% intake of estimated energy needs for less than 7 days < 75% intake of estimated energy needs for greater than 7 days < 50% intake  of estimated energy needs for > 5 days   Weight Loss 1-2% in 1 month  5% in 3 months  7.5% in 6 months  10% in 1 year 1-2 % in 1 week  5% in 1 month  7.5% in 3 months  10% in 6 months  20% in 1 year > 2% in 1 week  > 5% in 1 month  > 7.5% in 3 months  > 10% in 6 months  > 20% in 1 year   Physical Findings     None *Mild subcutaneous fat and/or muscle loss  *Mild fluid accumulation  *Stage II decubitus  *Surgical wound or non-healing wound *Mod/severe subcutaneous fat and/or muscle loss  *Mod/severe fluid accumulation  *Stage III or IV decubitus  *Non-healing surgical wound     Provider, please specify diagnosis or diagnoses associated with above clinical findings.      [ ] Moderate Protein-Calorie Malnutrition  [ x] Severe Protein-Calorie Malnutrition  [ ] Other:_____________________  [ ] Clinically Undetermined    Please document in your progress notes daily for the duration of treatment until resolved and include in your discharge summary.

## 2018-05-21 ENCOUNTER — TELEPHONE (OUTPATIENT)
Dept: ADMINISTRATIVE | Facility: CLINIC | Age: 83
End: 2018-05-21

## 2021-07-15 NOTE — ASSESSMENT & PLAN NOTE
89 yo female with hematemesis. No additional episodes through the night.   Hgb is stable.   Active Afib with RVR.  No plan for EGD at this time.   Start clear liquid diet. Discussed with Nicola Dick with [facility].   Antiemetics as needed.   Continue Protonix IV until patient tolerating po.     What Is The Reason For Today's Visit?: History of Non-Melanoma Skin Cancer How Many Skin Cancers Have You Had?: one When Was Your Last Cancer Diagnosed?: 2014

## 2023-11-15 NOTE — ED NOTES
Called [facility] for stat transport to [facility]. [id number] report given to lucila campos at [facility].     Next Entyvio infusion is on 1/10/24  at 11 am